# Patient Record
Sex: MALE | URBAN - METROPOLITAN AREA
[De-identification: names, ages, dates, MRNs, and addresses within clinical notes are randomized per-mention and may not be internally consistent; named-entity substitution may affect disease eponyms.]

---

## 2022-12-13 ENCOUNTER — HOSPITAL ENCOUNTER (OUTPATIENT)
Facility: HOSPITAL | Age: 57
Discharge: ANOTHER HOSPITAL | DRG: 247 | End: 2022-12-17
Attending: THORACIC SURGERY (CARDIOTHORACIC VASCULAR SURGERY) | Admitting: THORACIC SURGERY (CARDIOTHORACIC VASCULAR SURGERY)
Payer: COMMERCIAL

## 2022-12-13 DIAGNOSIS — I25.10 CAD (CORONARY ARTERY DISEASE): ICD-10-CM

## 2022-12-13 DIAGNOSIS — R07.9 CHEST PAIN: ICD-10-CM

## 2022-12-13 PROCEDURE — G0378 HOSPITAL OBSERVATION PER HR: HCPCS

## 2022-12-13 PROCEDURE — G0379 DIRECT REFER HOSPITAL OBSERV: HCPCS

## 2022-12-13 PROCEDURE — 20600001 HC STEP DOWN PRIVATE ROOM

## 2022-12-13 NOTE — Clinical Note
120 ml of contrast were injected throughout the case. 80 mL of contrast was the total wasted during the case. 200 mL was the total amount used during the case.

## 2022-12-13 NOTE — Clinical Note
The groin and right radial was prepped. The site was prepped with ChloraPrep. The site was clipped. The patient was draped.

## 2022-12-14 LAB
ANION GAP SERPL CALC-SCNC: 9 MMOL/L (ref 8–16)
APTT BLDCRRT: 29.1 SEC (ref 21–32)
BASOPHILS # BLD AUTO: 0.06 K/UL (ref 0–0.2)
BASOPHILS NFR BLD: 0.7 % (ref 0–1.9)
BUN SERPL-MCNC: 12 MG/DL (ref 6–20)
CALCIUM SERPL-MCNC: 9 MG/DL (ref 8.7–10.5)
CHLORIDE SERPL-SCNC: 105 MMOL/L (ref 95–110)
CO2 SERPL-SCNC: 23 MMOL/L (ref 23–29)
CREAT SERPL-MCNC: 0.8 MG/DL (ref 0.5–1.4)
DIFFERENTIAL METHOD: ABNORMAL
EOSINOPHIL # BLD AUTO: 0.2 K/UL (ref 0–0.5)
EOSINOPHIL NFR BLD: 2.9 % (ref 0–8)
ERYTHROCYTE [DISTWIDTH] IN BLOOD BY AUTOMATED COUNT: 12.4 % (ref 11.5–14.5)
EST. GFR  (NO RACE VARIABLE): >60 ML/MIN/1.73 M^2
GLUCOSE SERPL-MCNC: 83 MG/DL (ref 70–110)
HCT VFR BLD AUTO: 40.1 % (ref 40–54)
HGB BLD-MCNC: 13.4 G/DL (ref 14–18)
IMM GRANULOCYTES # BLD AUTO: 0.02 K/UL (ref 0–0.04)
IMM GRANULOCYTES NFR BLD AUTO: 0.2 % (ref 0–0.5)
INR PPP: 1.1 (ref 0.8–1.2)
LYMPHOCYTES # BLD AUTO: 1.8 K/UL (ref 1–4.8)
LYMPHOCYTES NFR BLD: 22.6 % (ref 18–48)
MCH RBC QN AUTO: 32.4 PG (ref 27–31)
MCHC RBC AUTO-ENTMCNC: 33.4 G/DL (ref 32–36)
MCV RBC AUTO: 97 FL (ref 82–98)
MONOCYTES # BLD AUTO: 0.5 K/UL (ref 0.3–1)
MONOCYTES NFR BLD: 6.4 % (ref 4–15)
NEUTROPHILS # BLD AUTO: 5.4 K/UL (ref 1.8–7.7)
NEUTROPHILS NFR BLD: 67.2 % (ref 38–73)
NRBC BLD-RTO: 0 /100 WBC
PLATELET # BLD AUTO: 189 K/UL (ref 150–450)
PMV BLD AUTO: 9.5 FL (ref 9.2–12.9)
POTASSIUM SERPL-SCNC: 4 MMOL/L (ref 3.5–5.1)
PROTHROMBIN TIME: 11 SEC (ref 9–12.5)
RBC # BLD AUTO: 4.13 M/UL (ref 4.6–6.2)
SODIUM SERPL-SCNC: 137 MMOL/L (ref 136–145)
WBC # BLD AUTO: 8.02 K/UL (ref 3.9–12.7)

## 2022-12-14 PROCEDURE — 85610 PROTHROMBIN TIME: CPT | Performed by: STUDENT IN AN ORGANIZED HEALTH CARE EDUCATION/TRAINING PROGRAM

## 2022-12-14 PROCEDURE — 85730 THROMBOPLASTIN TIME PARTIAL: CPT | Mod: 91 | Performed by: THORACIC SURGERY (CARDIOTHORACIC VASCULAR SURGERY)

## 2022-12-14 PROCEDURE — 96365 THER/PROPH/DIAG IV INF INIT: CPT

## 2022-12-14 PROCEDURE — 80048 BASIC METABOLIC PNL TOTAL CA: CPT | Performed by: STUDENT IN AN ORGANIZED HEALTH CARE EDUCATION/TRAINING PROGRAM

## 2022-12-14 PROCEDURE — 63600175 PHARM REV CODE 636 W HCPCS: Performed by: STUDENT IN AN ORGANIZED HEALTH CARE EDUCATION/TRAINING PROGRAM

## 2022-12-14 PROCEDURE — 36415 COLL VENOUS BLD VENIPUNCTURE: CPT | Performed by: STUDENT IN AN ORGANIZED HEALTH CARE EDUCATION/TRAINING PROGRAM

## 2022-12-14 PROCEDURE — 94761 N-INVAS EAR/PLS OXIMETRY MLT: CPT

## 2022-12-14 PROCEDURE — 85730 THROMBOPLASTIN TIME PARTIAL: CPT | Performed by: STUDENT IN AN ORGANIZED HEALTH CARE EDUCATION/TRAINING PROGRAM

## 2022-12-14 PROCEDURE — 20600001 HC STEP DOWN PRIVATE ROOM

## 2022-12-14 PROCEDURE — G0378 HOSPITAL OBSERVATION PER HR: HCPCS

## 2022-12-14 PROCEDURE — 85025 COMPLETE CBC W/AUTO DIFF WBC: CPT | Performed by: STUDENT IN AN ORGANIZED HEALTH CARE EDUCATION/TRAINING PROGRAM

## 2022-12-14 PROCEDURE — 25000003 PHARM REV CODE 250: Performed by: STUDENT IN AN ORGANIZED HEALTH CARE EDUCATION/TRAINING PROGRAM

## 2022-12-14 PROCEDURE — 99900035 HC TECH TIME PER 15 MIN (STAT)

## 2022-12-14 RX ORDER — HEPARIN SODIUM,PORCINE/D5W 25000/250
0-40 INTRAVENOUS SOLUTION INTRAVENOUS CONTINUOUS
Status: DISCONTINUED | OUTPATIENT
Start: 2022-12-14 | End: 2022-12-15

## 2022-12-14 RX ORDER — WARFARIN 10 MG/1
10 TABLET ORAL DAILY
COMMUNITY

## 2022-12-14 RX ORDER — PRAVASTATIN SODIUM 20 MG/1
20 TABLET ORAL NIGHTLY
Status: DISCONTINUED | OUTPATIENT
Start: 2022-12-14 | End: 2022-12-18 | Stop reason: HOSPADM

## 2022-12-14 RX ORDER — WARFARIN 7.5 MG/1
7.5 TABLET ORAL DAILY
COMMUNITY

## 2022-12-14 RX ORDER — AMLODIPINE BESYLATE 2.5 MG/1
2.5 TABLET ORAL DAILY
COMMUNITY

## 2022-12-14 RX ORDER — PANTOPRAZOLE SODIUM 40 MG/1
40 TABLET, DELAYED RELEASE ORAL DAILY
Status: DISCONTINUED | OUTPATIENT
Start: 2022-12-15 | End: 2022-12-15

## 2022-12-14 RX ORDER — ONDANSETRON 4 MG/1
8 TABLET, ORALLY DISINTEGRATING ORAL EVERY 8 HOURS PRN
Status: DISCONTINUED | OUTPATIENT
Start: 2022-12-14 | End: 2022-12-18 | Stop reason: HOSPADM

## 2022-12-14 RX ORDER — OXYCODONE HYDROCHLORIDE 5 MG/1
5 TABLET ORAL EVERY 4 HOURS PRN
Status: DISCONTINUED | OUTPATIENT
Start: 2022-12-14 | End: 2022-12-18 | Stop reason: HOSPADM

## 2022-12-14 RX ORDER — ASPIRIN 81 MG/1
81 TABLET ORAL DAILY
Status: DISCONTINUED | OUTPATIENT
Start: 2022-12-15 | End: 2022-12-18 | Stop reason: HOSPADM

## 2022-12-14 RX ORDER — PRAVASTATIN SODIUM 20 MG/1
20 TABLET ORAL NIGHTLY
COMMUNITY

## 2022-12-14 RX ORDER — ASPIRIN 325 MG
325 TABLET ORAL DAILY
COMMUNITY

## 2022-12-14 RX ORDER — OMEPRAZOLE 20 MG/1
20 CAPSULE, DELAYED RELEASE ORAL DAILY
COMMUNITY

## 2022-12-14 RX ORDER — SODIUM CHLORIDE 0.9 % (FLUSH) 0.9 %
10 SYRINGE (ML) INJECTION
Status: DISCONTINUED | OUTPATIENT
Start: 2022-12-14 | End: 2022-12-18 | Stop reason: HOSPADM

## 2022-12-14 RX ADMIN — PRAVASTATIN SODIUM 20 MG: 20 TABLET ORAL at 09:12

## 2022-12-14 RX ADMIN — HEPARIN SODIUM 19 UNITS/KG/HR: 10000 INJECTION, SOLUTION INTRAVENOUS at 06:12

## 2022-12-14 NOTE — H&P
[facility] - [facility]iology Step[facility]  CTS  History & Physical    Subjective:     History of Present Illness:  Juventino You is a 57 y.o. male with h/o mitral valve replacement (2001, mechanical), HTN, smoker who presents as transfer for CABG evaluation. Patient reports he is normally active and can climb a flight of stairs without getting out of breath, but over the past month he has been getting chest pain on exertion and short of breath with activity. Chest pain resolves with rest. He presented to outside hospital with chest pain and LHC showed multivessel disease. Normal EF on echo. Patient currently on heparin gtt and holding coumadin.   Patient is current 1.5ppd smoker.     No current facility-administered medications on file prior to encounter.     Current Outpatient Medications on File Prior to Encounter   Medication Sig    amLODIPine (NORVASC) 2.5 MG tablet Take 2.5 mg by mouth once daily.    aspirin 325 MG tablet Take 325 mg by mouth once daily.    omeprazole (PRILOSEC) 20 MG capsule Take 20 mg by mouth once daily.    pravastatin (PRAVACHOL) 20 MG tablet Take 20 mg by mouth every evening.    warfarin (COUMADIN) 10 MG tablet Take 10 mg by mouth once daily. Every Mon and Weds    warfarin (COUMADIN) 7.5 MG tablet Take 7.5 mg by mouth once daily. Every tues, Thurs, Fri, Sat. And Sun       Review of patient's allergies indicates:  No Known Allergies    No past medical history on file.  No past surgical history on file.  Family History    None       Tobacco Use    Smoking status: Not on file    Smokeless tobacco: Not on file   Substance and Sexual Activity    Alcohol use: Not on file    Drug use: Not on file    Sexual activity: Not on file     Review of Systems   Constitutional:  Negative for activity change, chills and fever.   Respiratory:  Negative for cough and shortness of breath.    Cardiovascular:  Negative for chest pain and palpitations.   Gastrointestinal:  Negative for abdominal distention and abdominal pain.    Musculoskeletal:  Negative for arthralgias and myalgias.   Neurological:  Negative for dizziness and headaches.   Psychiatric/Behavioral:  Negative for agitation and confusion.    Objective:     Vital Signs (Most Recent):  Temp: 98.2 °F (36.8 °C) (12/14/22 1634)  Pulse: (!) 54 (12/14/22 1634)  Resp: 18 (12/14/22 1634)  BP: 122/66 (12/14/22 1634)  SpO2: 95 % (12/14/22 1634)   Vital Signs (24h Range):  Temp:  [98.2 °F (36.8 °C)] 98.2 °F (36.8 °C)  Pulse:  [50-54] 54  Resp:  [17-18] 18  SpO2:  [95 %-98 %] 95 %  BP: (115-122)/(66-69) 122/66     Weight: 100.8 kg (222 lb 3.6 oz)  Body mass index is 29.32 kg/m².    Physical Exam  Constitutional:       General: He is not in acute distress.     Appearance: He is well-developed.   Cardiovascular:      Rate and Rhythm: Normal rate and regular rhythm.      Comments: Well healed sternal incision  Pulmonary:      Effort: Pulmonary effort is normal. No respiratory distress.   Abdominal:      General: There is no distension.      Palpations: Abdomen is soft.      Tenderness: There is no abdominal tenderness.   Skin:     General: Skin is warm and dry.   Neurological:      Mental Status: He is alert and oriented to person, place, and time.   Psychiatric:         Behavior: Behavior normal.       Significant Labs:  I have reviewed all pertinent lab results within the past 24 hours.  CBC: No results for input(s): WBC, RBC, HGB, HCT, PLT, MCV, MCH, MCHC in the last 168 hours.  BMP: No results for input(s): GLU, NA, K, CL, CO2, BUN, CREATININE, CALCIUM, MG in the last 168 hours.    Significant Diagnostics:  I have reviewed all pertinent imaging results/findings within the past 24 hours.    Assessment/Plan:     Patient with multivessel CAD and h/o mechanical mitral valve replacement.     - Will need to obtain angiogram. CD sent with records contains CT chest and chest xrays.   - Continue heparin gtt, PTT goal 60-80  - ASA, statin  - Cardiac diet  - Will review angiogram and discuss  surgical timing.    Selam Santizo MD, PGY-7  Cardiothoracic Surgery   [phone]

## 2022-12-14 NOTE — NURSING
Pt is currently not in the hospital - the unit is currently waiting for pt to come from [facility] [place]- pt was mistakenly placed in bed.

## 2022-12-15 PROBLEM — I10 ESSENTIAL HYPERTENSION: Status: ACTIVE | Noted: 2022-12-15

## 2022-12-15 PROBLEM — I25.10 CAD (CORONARY ARTERY DISEASE): Status: ACTIVE | Noted: 2022-12-15

## 2022-12-15 PROBLEM — Z95.2 S/P MVR (MITRAL VALVE REPLACEMENT): Status: ACTIVE | Noted: 2022-12-15

## 2022-12-15 LAB
ABO + RH BLD: NORMAL
ANION GAP SERPL CALC-SCNC: 7 MMOL/L (ref 8–16)
APTT BLDCRRT: 45.6 SEC (ref 21–32)
APTT BLDCRRT: 48.6 SEC (ref 21–32)
ASCENDING AORTA: 3.66 CM
AV INDEX (PROSTH): 0.32
AV MEAN GRADIENT: 31 MMHG
AV PEAK GRADIENT: 47 MMHG
AV VALVE AREA: 1.26 CM2
AV VELOCITY RATIO: 0.27
BASOPHILS # BLD AUTO: 0.07 K/UL (ref 0–0.2)
BASOPHILS NFR BLD: 1 % (ref 0–1.9)
BLD GP AB SCN CELLS X3 SERPL QL: NORMAL
BSA FOR ECHO PROCEDURE: 2.28 M2
BUN SERPL-MCNC: 11 MG/DL (ref 6–20)
CALCIUM SERPL-MCNC: 9.1 MG/DL (ref 8.7–10.5)
CHLORIDE SERPL-SCNC: 104 MMOL/L (ref 95–110)
CO2 SERPL-SCNC: 26 MMOL/L (ref 23–29)
CREAT SERPL-MCNC: 1 MG/DL (ref 0.5–1.4)
CV ECHO LV RWT: 0.41 CM
DIFFERENTIAL METHOD: ABNORMAL
DOP CALC AO PEAK VEL: 3.42 M/S
DOP CALC AO VTI: 68.81 CM
DOP CALC LVOT AREA: 3.9 CM2
DOP CALC LVOT DIAMETER: 2.23 CM
DOP CALC LVOT PEAK VEL: 0.94 M/S
DOP CALC LVOT STROKE VOLUME: 86.51 CM3
DOP CALCLVOT PEAK VEL VTI: 22.16 CM
E WAVE DECELERATION TIME: 321.52 MSEC
E/A RATIO: 0.89
E/E' RATIO: 6.7 M/S
ECHO LV POSTERIOR WALL: 0.96 CM (ref 0.6–1.1)
EJECTION FRACTION: 68 %
EOSINOPHIL # BLD AUTO: 0.3 K/UL (ref 0–0.5)
EOSINOPHIL NFR BLD: 3.8 % (ref 0–8)
ERYTHROCYTE [DISTWIDTH] IN BLOOD BY AUTOMATED COUNT: 12.6 % (ref 11.5–14.5)
EST. GFR  (NO RACE VARIABLE): >60 ML/MIN/1.73 M^2
FRACTIONAL SHORTENING: 29 % (ref 28–44)
GLUCOSE SERPL-MCNC: 91 MG/DL (ref 70–110)
HCT VFR BLD AUTO: 41.5 % (ref 40–54)
HGB BLD-MCNC: 13.8 G/DL (ref 14–18)
IMM GRANULOCYTES # BLD AUTO: 0.04 K/UL (ref 0–0.04)
IMM GRANULOCYTES NFR BLD AUTO: 0.6 % (ref 0–0.5)
INTERVENTRICULAR SEPTUM: 1.12 CM (ref 0.6–1.1)
IVRT: 114.18 MSEC
LA MAJOR: 5.29 CM
LA MINOR: 5.76 CM
LA WIDTH: 4.66 CM
LEFT ATRIUM SIZE: 3.73 CM
LEFT ATRIUM VOLUME INDEX MOD: 32.2 ML/M2
LEFT ATRIUM VOLUME INDEX: 36.2 ML/M2
LEFT ATRIUM VOLUME MOD: 72.56 CM3
LEFT ATRIUM VOLUME: 81.48 CM3
LEFT INTERNAL DIMENSION IN SYSTOLE: 3.34 CM (ref 2.1–4)
LEFT VENTRICLE DIASTOLIC VOLUME INDEX: 44.94 ML/M2
LEFT VENTRICLE DIASTOLIC VOLUME: 101.12 ML
LEFT VENTRICLE MASS INDEX: 77 G/M2
LEFT VENTRICLE SYSTOLIC VOLUME INDEX: 20.2 ML/M2
LEFT VENTRICLE SYSTOLIC VOLUME: 45.48 ML
LEFT VENTRICULAR INTERNAL DIMENSION IN DIASTOLE: 4.68 CM (ref 3.5–6)
LEFT VENTRICULAR MASS: 172.34 G
LV LATERAL E/E' RATIO: 5.92 M/S
LV SEPTAL E/E' RATIO: 7.7 M/S
LYMPHOCYTES # BLD AUTO: 1.8 K/UL (ref 1–4.8)
LYMPHOCYTES NFR BLD: 24.4 % (ref 18–48)
MCH RBC QN AUTO: 33.1 PG (ref 27–31)
MCHC RBC AUTO-ENTMCNC: 33.3 G/DL (ref 32–36)
MCV RBC AUTO: 100 FL (ref 82–98)
MONOCYTES # BLD AUTO: 0.6 K/UL (ref 0.3–1)
MONOCYTES NFR BLD: 8.1 % (ref 4–15)
MV PEAK A VEL: 0.87 M/S
MV PEAK E VEL: 0.77 M/S
MV STENOSIS PRESSURE HALF TIME: 93.24 MS
MV VALVE AREA P 1/2 METHOD: 2.36 CM2
NEUTROPHILS # BLD AUTO: 4.5 K/UL (ref 1.8–7.7)
NEUTROPHILS NFR BLD: 62.1 % (ref 38–73)
NRBC BLD-RTO: 0 /100 WBC
PISA TR MAX VEL: 2.57 M/S
PLATELET # BLD AUTO: 166 K/UL (ref 150–450)
PMV BLD AUTO: 9.3 FL (ref 9.2–12.9)
POTASSIUM SERPL-SCNC: 4.5 MMOL/L (ref 3.5–5.1)
RA MAJOR: 4.95 CM
RA WIDTH: 3.6 CM
RBC # BLD AUTO: 4.17 M/UL (ref 4.6–6.2)
RIGHT VENTRICULAR END-DIASTOLIC DIMENSION: 3.67 CM
RV TISSUE DOPPLER FREE WALL SYSTOLIC VELOCITY 1 (APICAL 4 CHAMBER VIEW): 10.16 CM/S
SINUS: 3.27 CM
SODIUM SERPL-SCNC: 137 MMOL/L (ref 136–145)
STJ: 2.76 CM
TDI LATERAL: 0.13 M/S
TDI SEPTAL: 0.1 M/S
TDI: 0.12 M/S
TR MAX PG: 26 MMHG
TRICUSPID ANNULAR PLANE SYSTOLIC EXCURSION: 1.83 CM
WBC # BLD AUTO: 7.18 K/UL (ref 3.9–12.7)

## 2022-12-15 PROCEDURE — 85730 THROMBOPLASTIN TIME PARTIAL: CPT | Performed by: THORACIC SURGERY (CARDIOTHORACIC VASCULAR SURGERY)

## 2022-12-15 PROCEDURE — C2623 CATH, TRANSLUMIN, DRUG-COAT: HCPCS | Performed by: INTERNAL MEDICINE

## 2022-12-15 PROCEDURE — 25000003 PHARM REV CODE 250: Performed by: INTERNAL MEDICINE

## 2022-12-15 PROCEDURE — 36415 COLL VENOUS BLD VENIPUNCTURE: CPT | Performed by: STUDENT IN AN ORGANIZED HEALTH CARE EDUCATION/TRAINING PROGRAM

## 2022-12-15 PROCEDURE — 20600001 HC STEP DOWN PRIVATE ROOM

## 2022-12-15 PROCEDURE — 63600175 PHARM REV CODE 636 W HCPCS: Performed by: INTERNAL MEDICINE

## 2022-12-15 PROCEDURE — 86901 BLOOD TYPING SEROLOGIC RH(D): CPT | Performed by: STUDENT IN AN ORGANIZED HEALTH CARE EDUCATION/TRAINING PROGRAM

## 2022-12-15 PROCEDURE — C1887 CATHETER, GUIDING: HCPCS | Performed by: INTERNAL MEDICINE

## 2022-12-15 PROCEDURE — 25000003 PHARM REV CODE 250: Performed by: STUDENT IN AN ORGANIZED HEALTH CARE EDUCATION/TRAINING PROGRAM

## 2022-12-15 PROCEDURE — 63600175 PHARM REV CODE 636 W HCPCS: Performed by: NURSE PRACTITIONER

## 2022-12-15 PROCEDURE — C1894 INTRO/SHEATH, NON-LASER: HCPCS | Performed by: INTERNAL MEDICINE

## 2022-12-15 PROCEDURE — 92928 PRQ TCAT PLMT NTRAC ST 1 LES: CPT | Mod: LD,,, | Performed by: INTERNAL MEDICINE

## 2022-12-15 PROCEDURE — 93005 ELECTROCARDIOGRAM TRACING: CPT | Mod: 59

## 2022-12-15 PROCEDURE — 27201423 OPTIME MED/SURG SUP & DEVICES STERILE SUPPLY: Performed by: INTERNAL MEDICINE

## 2022-12-15 PROCEDURE — G0378 HOSPITAL OBSERVATION PER HR: HCPCS

## 2022-12-15 PROCEDURE — 99233 PR SUBSEQUENT HOSPITAL CARE,LEVL III: ICD-10-PCS | Mod: ,,, | Performed by: INTERNAL MEDICINE

## 2022-12-15 PROCEDURE — C9600 PERC DRUG-EL COR STENT SING: HCPCS | Mod: LD | Performed by: INTERNAL MEDICINE

## 2022-12-15 PROCEDURE — 96376 TX/PRO/DX INJ SAME DRUG ADON: CPT | Mod: 59

## 2022-12-15 PROCEDURE — 96366 THER/PROPH/DIAG IV INF ADDON: CPT

## 2022-12-15 PROCEDURE — 93010 EKG 12-LEAD: ICD-10-PCS | Mod: ,,, | Performed by: INTERNAL MEDICINE

## 2022-12-15 PROCEDURE — 96375 TX/PRO/DX INJ NEW DRUG ADDON: CPT

## 2022-12-15 PROCEDURE — 99233 SBSQ HOSP IP/OBS HIGH 50: CPT | Mod: ,,, | Performed by: INTERNAL MEDICINE

## 2022-12-15 PROCEDURE — 63600175 PHARM REV CODE 636 W HCPCS: Performed by: STUDENT IN AN ORGANIZED HEALTH CARE EDUCATION/TRAINING PROGRAM

## 2022-12-15 PROCEDURE — 85025 COMPLETE CBC W/AUTO DIFF WBC: CPT | Performed by: STUDENT IN AN ORGANIZED HEALTH CARE EDUCATION/TRAINING PROGRAM

## 2022-12-15 PROCEDURE — 36415 COLL VENOUS BLD VENIPUNCTURE: CPT | Performed by: THORACIC SURGERY (CARDIOTHORACIC VASCULAR SURGERY)

## 2022-12-15 PROCEDURE — C1725 CATH, TRANSLUMIN NON-LASER: HCPCS | Performed by: INTERNAL MEDICINE

## 2022-12-15 PROCEDURE — 92928 PR STENT: ICD-10-PCS | Mod: LD,,, | Performed by: INTERNAL MEDICINE

## 2022-12-15 PROCEDURE — 80048 BASIC METABOLIC PNL TOTAL CA: CPT | Performed by: STUDENT IN AN ORGANIZED HEALTH CARE EDUCATION/TRAINING PROGRAM

## 2022-12-15 PROCEDURE — 93010 ELECTROCARDIOGRAM REPORT: CPT | Mod: ,,, | Performed by: INTERNAL MEDICINE

## 2022-12-15 PROCEDURE — 25000003 PHARM REV CODE 250: Performed by: NURSE PRACTITIONER

## 2022-12-15 PROCEDURE — 25500020 PHARM REV CODE 255: Performed by: INTERNAL MEDICINE

## 2022-12-15 PROCEDURE — C1769 GUIDE WIRE: HCPCS | Performed by: INTERNAL MEDICINE

## 2022-12-15 PROCEDURE — 85347 COAGULATION TIME ACTIVATED: CPT | Performed by: INTERNAL MEDICINE

## 2022-12-15 DEVICE — STENT ONYXNG30018UX ONYX 3.00X18RX
Type: IMPLANTABLE DEVICE | Site: HEART | Status: FUNCTIONAL
Brand: ONYX FRONTIER™

## 2022-12-15 DEVICE — STENT ONYXNG30008UX ONYX 3.00X08RX
Type: IMPLANTABLE DEVICE | Site: HEART | Status: FUNCTIONAL
Brand: ONYX FRONTIER™

## 2022-12-15 RX ORDER — FAMOTIDINE 20 MG/1
20 TABLET, FILM COATED ORAL 2 TIMES DAILY
Status: DISCONTINUED | OUTPATIENT
Start: 2022-12-15 | End: 2022-12-18 | Stop reason: HOSPADM

## 2022-12-15 RX ORDER — LIDOCAINE HYDROCHLORIDE 10 MG/ML
INJECTION INFILTRATION; PERINEURAL
Status: DISCONTINUED | OUTPATIENT
Start: 2022-12-15 | End: 2022-12-18 | Stop reason: HOSPADM

## 2022-12-15 RX ORDER — CLOPIDOGREL 300 MG/1
600 TABLET, FILM COATED ORAL ONCE
Status: DISCONTINUED | OUTPATIENT
Start: 2022-12-15 | End: 2022-12-15

## 2022-12-15 RX ORDER — SODIUM CHLORIDE 0.9 % (FLUSH) 0.9 %
10 SYRINGE (ML) INJECTION
Status: DISCONTINUED | OUTPATIENT
Start: 2022-12-15 | End: 2022-12-18 | Stop reason: HOSPADM

## 2022-12-15 RX ORDER — HEPARIN SOD,PORCINE/0.9 % NACL 1000/500ML
INTRAVENOUS SOLUTION INTRAVENOUS
Status: DISCONTINUED | OUTPATIENT
Start: 2022-12-15 | End: 2022-12-18 | Stop reason: HOSPADM

## 2022-12-15 RX ORDER — METOPROLOL TARTRATE 25 MG/1
12.5 TABLET ORAL 2 TIMES DAILY
Status: DISCONTINUED | OUTPATIENT
Start: 2022-12-15 | End: 2022-12-18 | Stop reason: HOSPADM

## 2022-12-15 RX ORDER — POTASSIUM CHLORIDE 20 MEQ/1
20 TABLET, EXTENDED RELEASE ORAL 2 TIMES DAILY
Status: DISCONTINUED | OUTPATIENT
Start: 2022-12-15 | End: 2022-12-18 | Stop reason: HOSPADM

## 2022-12-15 RX ORDER — SODIUM CHLORIDE 9 MG/ML
INJECTION, SOLUTION INTRAVENOUS CONTINUOUS
Status: ACTIVE | OUTPATIENT
Start: 2022-12-15 | End: 2022-12-15

## 2022-12-15 RX ORDER — FUROSEMIDE 10 MG/ML
20 INJECTION INTRAMUSCULAR; INTRAVENOUS 2 TIMES DAILY
Status: DISCONTINUED | OUTPATIENT
Start: 2022-12-15 | End: 2022-12-17

## 2022-12-15 RX ORDER — CLOPIDOGREL 300 MG/1
600 TABLET, FILM COATED ORAL ONCE
Status: COMPLETED | OUTPATIENT
Start: 2022-12-15 | End: 2022-12-15

## 2022-12-15 RX ORDER — HEPARIN SODIUM 1000 [USP'U]/ML
INJECTION, SOLUTION INTRAVENOUS; SUBCUTANEOUS
Status: DISCONTINUED | OUTPATIENT
Start: 2022-12-15 | End: 2022-12-18 | Stop reason: HOSPADM

## 2022-12-15 RX ORDER — DIPHENHYDRAMINE HYDROCHLORIDE 50 MG/ML
INJECTION INTRAMUSCULAR; INTRAVENOUS
Status: DISCONTINUED | OUTPATIENT
Start: 2022-12-15 | End: 2022-12-18 | Stop reason: HOSPADM

## 2022-12-15 RX ORDER — CLOPIDOGREL BISULFATE 75 MG/1
75 TABLET ORAL DAILY
Status: DISCONTINUED | OUTPATIENT
Start: 2022-12-16 | End: 2022-12-18 | Stop reason: HOSPADM

## 2022-12-15 RX ORDER — HEPARIN SODIUM 10000 [USP'U]/100ML
1700 INJECTION, SOLUTION INTRAVENOUS CONTINUOUS
Status: DISCONTINUED | OUTPATIENT
Start: 2022-12-15 | End: 2022-12-16

## 2022-12-15 RX ORDER — DIPHENHYDRAMINE HCL 50 MG
50 CAPSULE ORAL ONCE
Status: COMPLETED | OUTPATIENT
Start: 2022-12-15 | End: 2022-12-15

## 2022-12-15 RX ORDER — PHENYLEPHRINE HYDROCHLORIDE 10 MG/ML
INJECTION INTRAVENOUS
Status: DISCONTINUED | OUTPATIENT
Start: 2022-12-15 | End: 2022-12-18 | Stop reason: HOSPADM

## 2022-12-15 RX ADMIN — METOPROLOL TARTRATE 12.5 MG: 25 TABLET, FILM COATED ORAL at 08:12

## 2022-12-15 RX ADMIN — HEPARIN SODIUM 1700 UNITS/HR: 10000 INJECTION, SOLUTION INTRAVENOUS at 10:12

## 2022-12-15 RX ADMIN — FUROSEMIDE 20 MG: 10 INJECTION, SOLUTION INTRAMUSCULAR; INTRAVENOUS at 12:12

## 2022-12-15 RX ADMIN — PRAVASTATIN SODIUM 20 MG: 20 TABLET ORAL at 08:12

## 2022-12-15 RX ADMIN — FAMOTIDINE 20 MG: 20 TABLET ORAL at 08:12

## 2022-12-15 RX ADMIN — PANTOPRAZOLE SODIUM 40 MG: 40 TABLET, DELAYED RELEASE ORAL at 08:12

## 2022-12-15 RX ADMIN — FUROSEMIDE 20 MG: 10 INJECTION, SOLUTION INTRAMUSCULAR; INTRAVENOUS at 08:12

## 2022-12-15 RX ADMIN — POTASSIUM CHLORIDE 20 MEQ: 1500 TABLET, EXTENDED RELEASE ORAL at 08:12

## 2022-12-15 RX ADMIN — ASPIRIN 81 MG: 81 TABLET, COATED ORAL at 08:12

## 2022-12-15 RX ADMIN — DIPHENHYDRAMINE HYDROCHLORIDE 50 MG: 50 CAPSULE ORAL at 03:12

## 2022-12-15 RX ADMIN — CLOPIDOGREL BISULFATE 600 MG: 300 TABLET, FILM COATED ORAL at 03:12

## 2022-12-15 RX ADMIN — HEPARIN SODIUM 19 UNITS/KG/HR: 10000 INJECTION, SOLUTION INTRAVENOUS at 06:12

## 2022-12-15 RX ADMIN — POTASSIUM CHLORIDE 20 MEQ: 1500 TABLET, EXTENDED RELEASE ORAL at 12:12

## 2022-12-15 RX ADMIN — HEPARIN SODIUM 1700 UNITS/HR: 10000 INJECTION, SOLUTION INTRAVENOUS at 11:12

## 2022-12-15 NOTE — PROGRESS NOTES
[facility] - [facility] Stepdown  Cardiothoracic Surgery  Progress Note    Patient Name: Juventino You  MRN: [id number]  Admission Date: 12/13/2022  Hospital Length of Stay: 2 days  Code Status: Full Code   Attending Physician: Vinicius Spencer MD   Referring Provider: Bret Gunter Jr.,*  Principal Problem:CAD (coronary artery disease)    Subjective:     Post-Op Info:  * No surgery found *         Interval History: Patient admitted overnight for CABG evaluation vs PCI.  Cath films in [facility].  Consult placed to interventional cardiology. Previous MVR, will need CT CAP to determine if it is safe for re-entry. Will determine needs once work up is completed.    Review of Systems   Constitutional: Negative for malaise/fatigue.   Cardiovascular:  Negative for chest pain, claudication, dyspnea on exertion, irregular heartbeat, leg swelling and palpitations.   Respiratory:  Negative for cough and shortness of breath.    Hematologic/Lymphatic: Negative for bleeding problem.   Gastrointestinal:  Negative for abdominal pain.   Genitourinary:  Negative for dysuria.   Neurological:  Negative for headaches and weakness.   Medications:  Continuous Infusions:   heparin (porcine) in D5W 19 Units/kg/hr (12/15/22 0613)     Scheduled Meds:   aspirin  81 mg Oral Daily    pantoprazole  40 mg Oral Daily    pravastatin  20 mg Oral QHS     PRN Meds:heparin (PORCINE), heparin (PORCINE), ondansetron, oxyCODONE, sodium chloride 0.9%     Objective:     Vital Signs (Most Recent):  Temp: 97.4 °F (36.3 °C) (12/15/22 0735)  Pulse: (!) 49 (12/15/22 0742)  Resp: 18 (12/15/22 0735)  BP: (!) 110/58 (12/15/22 0735)  SpO2: 98 % (12/15/22 0735)   Vital Signs (24h Range):  Temp:  [96.4 °F (35.8 °C)-98.2 °F (36.8 °C)] 97.4 °F (36.3 °C)  Pulse:  [45-63] 49  Resp:  [16-18] 18  SpO2:  [95 %-98 %] 98 %  BP: (103-132)/(58-66) 110/58     Weight: 100.8 kg (222 lb 3.6 oz)  Body mass index is 29.32 kg/m².    SpO2: 98 %       Intake/Output - Last 3 Shifts          12/13 0700  12/14 0659 12/14 0700  12/15 0659 12/15 0700  12/16 0659           Urine Occurrence  2 x             Lines/Drains/Airways       Peripheral Intravenous Line  Duration                  Peripheral IV - Single Lumen 12/14/22 1908 20 G Left Wrist <1 day                    Physical Exam  HENT:      Head: Normocephalic and atraumatic.   Eyes:      Extraocular Movements: Extraocular movements intact.   Cardiovascular:      Rate and Rhythm: Normal rate and regular rhythm.      Heart sounds: Normal heart sounds.   Pulmonary:      Effort: Pulmonary effort is normal.      Breath sounds: Normal breath sounds.   Abdominal:      General: Abdomen is flat.      Palpations: Abdomen is soft.   Musculoskeletal:         General: Normal range of motion.      Cervical back: Normal range of motion.   Skin:     General: Skin is warm and dry.      Capillary Refill: Capillary refill takes less than 2 seconds.   Neurological:      General: No focal deficit present.       Significant Labs:  BMP:   Recent Labs   Lab 12/15/22  0555   GLU 91      K 4.5      CO2 26   BUN 11   CREATININE 1.0   CALCIUM 9.1     CBC:   Recent Labs   Lab 12/15/22  0555   WBC 7.18   RBC 4.17*   HGB 13.8*   HCT 41.5      *   MCH 33.1*   MCHC 33.3     CMP:   Recent Labs   Lab 12/15/22  0555   GLU 91   CALCIUM 9.1      K 4.5   CO2 26      BUN 11   CREATININE 1.0     Coagulation:   Recent Labs   Lab 12/14/22  1717 12/14/22  2348 12/15/22  0555   INR 1.1  --   --    APTT 29.1   < > 48.6*    < > = values in this interval not displayed.       Significant Diagnostics:  I have reviewed and interpreted all pertinent imaging results/findings within the past 24 hours.    Assessment/Plan:     * CAD (coronary artery disease)   - ASA  - BB  - Statin  - Lasix  with scheduled potassium ordered   - Encourage ambulation  - Cardiac diet with 1500 cc fluid restriction   - Bowel regimen in place   - Famotidine for ulcer prevention   -  Monitor electrolytes to keep Mag above 2 and Potassium above 4  - OOBTO   - Encourage IS use  - Heparin - no nomogram  - PTT goal is 60-80      Essential hypertension  - Holding home amlodipine   - Keeps MAPs 60-80    S/P MVR (mitral valve replacement)  - S/P Mechanical MVR in 2001  - Holding home Coumadin   - Heparin drip with no nomogram   - PTT goal is 60-80  - q6 hour PTT while on heparin   - Contact CTS provider for increase in heparin drip if outside of PTT goal        Robyn Perales NP  Cardiothoracic Surgery  [facility] Step[facility]

## 2022-12-15 NOTE — SUBJECTIVE & OBJECTIVE
Interval History: Patient admitted overnight for CABG evaluation vs PCI.  Cath films in [facility].  Consult placed to interventional cardiology. Previous MVR, will need CT CAP to determine if it is safe for re-entry. Will determine needs once work up is completed.    Review of Systems   Constitutional: Negative for malaise/fatigue.   Cardiovascular:  Negative for chest pain, claudication, dyspnea on exertion, irregular heartbeat, leg swelling and palpitations.   Respiratory:  Negative for cough and shortness of breath.    Hematologic/Lymphatic: Negative for bleeding problem.   Gastrointestinal:  Negative for abdominal pain.   Genitourinary:  Negative for dysuria.   Neurological:  Negative for headaches and weakness.   Medications:  Continuous Infusions:   heparin (porcine) in D5W 19 Units/kg/hr (12/15/22 0613)     Scheduled Meds:   aspirin  81 mg Oral Daily    pantoprazole  40 mg Oral Daily    pravastatin  20 mg Oral QHS     PRN Meds:heparin (PORCINE), heparin (PORCINE), ondansetron, oxyCODONE, sodium chloride 0.9%     Objective:     Vital Signs (Most Recent):  Temp: 97.4 °F (36.3 °C) (12/15/22 0735)  Pulse: (!) 49 (12/15/22 0742)  Resp: 18 (12/15/22 0735)  BP: (!) 110/58 (12/15/22 0735)  SpO2: 98 % (12/15/22 0735)   Vital Signs (24h Range):  Temp:  [96.4 °F (35.8 °C)-98.2 °F (36.8 °C)] 97.4 °F (36.3 °C)  Pulse:  [45-63] 49  Resp:  [16-18] 18  SpO2:  [95 %-98 %] 98 %  BP: (103-132)/(58-66) 110/58     Weight: 100.8 kg (222 lb 3.6 oz)  Body mass index is 29.32 kg/m².    SpO2: 98 %       Intake/Output - Last 3 Shifts         12/13 0700  12/14 0659 12/14 0700  12/15 0659 12/15 0700  12/16 0659           Urine Occurrence  2 x             Lines/Drains/Airways       Peripheral Intravenous Line  Duration                  Peripheral IV - Single Lumen 12/14/22 1908 20 G Left Wrist <1 day                    Physical Exam  HENT:      Head: Normocephalic and atraumatic.   Eyes:      Extraocular Movements: Extraocular movements  intact.   Cardiovascular:      Rate and Rhythm: Normal rate and regular rhythm.      Heart sounds: Normal heart sounds.   Pulmonary:      Effort: Pulmonary effort is normal.      Breath sounds: Normal breath sounds.   Abdominal:      General: Abdomen is flat.      Palpations: Abdomen is soft.   Musculoskeletal:         General: Normal range of motion.      Cervical back: Normal range of motion.   Skin:     General: Skin is warm and dry.      Capillary Refill: Capillary refill takes less than 2 seconds.   Neurological:      General: No focal deficit present.       Significant Labs:  BMP:   Recent Labs   Lab 12/15/22  0555   GLU 91      K 4.5      CO2 26   BUN 11   CREATININE 1.0   CALCIUM 9.1     CBC:   Recent Labs   Lab 12/15/22  0555   WBC 7.18   RBC 4.17*   HGB 13.8*   HCT 41.5      *   MCH 33.1*   MCHC 33.3     CMP:   Recent Labs   Lab 12/15/22  0555   GLU 91   CALCIUM 9.1      K 4.5   CO2 26      BUN 11   CREATININE 1.0     Coagulation:   Recent Labs   Lab 12/14/22  1717 12/14/22  2348 12/15/22  0555   INR 1.1  --   --    APTT 29.1   < > 48.6*    < > = values in this interval not displayed.       Significant Diagnostics:  I have reviewed and interpreted all pertinent imaging results/findings within the past 24 hours.

## 2022-12-15 NOTE — SUBJECTIVE & OBJECTIVE
No past medical history on file.    No past surgical history on file.    Review of patient's allergies indicates:  No Known Allergies    PTA Medications   Medication Sig    amLODIPine (NORVASC) 2.5 MG tablet Take 2.5 mg by mouth once daily.    aspirin 325 MG tablet Take 325 mg by mouth once daily.    omeprazole (PRILOSEC) 20 MG capsule Take 20 mg by mouth once daily.    pravastatin (PRAVACHOL) 20 MG tablet Take 20 mg by mouth every evening.    warfarin (COUMADIN) 10 MG tablet Take 10 mg by mouth once daily. Every Mon and Weds    warfarin (COUMADIN) 7.5 MG tablet Take 7.5 mg by mouth once daily. Every tues, Thurs, Fri, Sat. And Sun     Family History    None       Tobacco Use    Smoking status: Not on file    Smokeless tobacco: Not on file   Substance and Sexual Activity    Alcohol use: Not on file    Drug use: Not on file    Sexual activity: Not on file     Review of Systems   Cardiovascular:  Positive for chest pain.   All other systems reviewed and are negative.  Objective:     Vital Signs (Most Recent):  Temp: 98 °F (36.7 °C) (12/15/22 1140)  Pulse: (!) 58 (12/15/22 1140)  Resp: 18 (12/15/22 1140)  BP: 125/70 (12/15/22 1140)  SpO2: 97 % (12/15/22 1140)   Vital Signs (24h Range):  Temp:  [96.4 °F (35.8 °C)-98.2 °F (36.8 °C)] 98 °F (36.7 °C)  Pulse:  [45-63] 58  Resp:  [16-18] 18  SpO2:  [95 %-98 %] 97 %  BP: (103-132)/(58-70) 125/70     Weight: 100.7 kg (222 lb)  Body mass index is 29.29 kg/m².    SpO2: 97 %       No intake or output data in the 24 hours ending 12/15/22 1340    Lines/Drains/Airways       Peripheral Intravenous Line  Duration                  Peripheral IV - Single Lumen 12/14/22 1908 20 G Left Wrist <1 day                    Physical Exam  Vitals and nursing note reviewed.   Constitutional:       Appearance: Normal appearance.   HENT:      Head: Normocephalic and atraumatic.      Right Ear: External ear normal.      Left Ear: External ear normal.      Nose: Nose normal.      Mouth/Throat:       Mouth: Mucous membranes are moist.   Eyes:      Pupils: Pupils are equal, round, and reactive to light.   Cardiovascular:      Rate and Rhythm: Normal rate and regular rhythm.      Pulses: Normal pulses.   Pulmonary:      Effort: Pulmonary effort is normal.   Abdominal:      General: Abdomen is flat.   Musculoskeletal:         General: Normal range of motion.      Cervical back: Normal range of motion.      Right lower leg: No edema.      Left lower leg: No edema.   Skin:     General: Skin is warm and dry.      Capillary Refill: Capillary refill takes less than 2 seconds.   Neurological:      General: No focal deficit present.      Mental Status: He is alert and oriented to person, place, and time.       Significant Labs: All pertinent lab results from the last 24 hours have been reviewed.    Significant Imaging:  reviewed

## 2022-12-15 NOTE — CONSULTS
[facility] - [facility] Stepdown  Interventional Cardiology  Consult Note    Patient Name: Juvention You  MRN: [id number]  Admission Date: 12/13/2022  Hospital Length of Stay: 2 days  Code Status: Full Code   Attending Provider: Vinicius Spencer MD  Consulting Provider: Aiden Warner MD  Primary Care Physician: Manish oCoper Jr, MD  Principal Problem:CAD (coronary artery disease)    Patient information was obtained from patient and past medical records.     Inpatient consult to Interventional Cardiology  Consult performed by: Aiden Warner MD  Consult ordered by: Robyn Perales NP        Subjective:     Chief Complaint:  Chest pain      HPI:  Juventino You is a 57 y.o. 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 Patient reports he is normally active and can climb a flight of stairs without getting out of breath, but over the past month he has been getting chest pain on exertion and short of breath with activity. Chest pain resolves with rest. He presented to outside hospital with chest pain and LHC showed multivessel disease. Normal EF on echo. Patient currently on heparin gtt and holding coumadin. [place] [facility] [place] however, patient prefers attempt with PCI. IC was then consulted for evaulation.            No past medical history on file.    No past surgical history on file.    Review of patient's allergies indicates:  No Known Allergies    PTA Medications   Medication Sig    amLODIPine (NORVASC) 2.5 MG tablet Take 2.5 mg by mouth once daily.    aspirin 325 MG tablet Take 325 mg by mouth once daily.    omeprazole (PRILOSEC) 20 MG capsule Take 20 mg by mouth once daily.    pravastatin (PRAVACHOL) 20 MG tablet Take 20 mg by mouth every evening.    warfarin (COUMADIN) 10 MG tablet Take 10 mg by mouth once daily. Every Mon and Weds    warfarin (COUMADIN) 7.5 MG tablet Take 7.5 mg  by mouth once daily. Every tues, Thurs, Fri, Sat. And Sun     Family History    None       Tobacco Use    Smoking status: Not on file    Smokeless tobacco: Not on file   Substance and Sexual Activity    Alcohol use: Not on file    Drug use: Not on file    Sexual activity: Not on file     Review of Systems   Cardiovascular:  Positive for chest pain.   All other systems reviewed and are negative.  Objective:     Vital Signs (Most Recent):  Temp: 98 °F (36.7 °C) (12/15/22 1140)  Pulse: (!) 58 (12/15/22 1140)  Resp: 18 (12/15/22 1140)  BP: 125/70 (12/15/22 1140)  SpO2: 97 % (12/15/22 1140)   Vital Signs (24h Range):  Temp:  [96.4 °F (35.8 °C)-98.2 °F (36.8 °C)] 98 °F (36.7 °C)  Pulse:  [45-63] 58  Resp:  [16-18] 18  SpO2:  [95 %-98 %] 97 %  BP: (103-132)/(58-70) 125/70     Weight: 100.7 kg (222 lb)  Body mass index is 29.29 kg/m².    SpO2: 97 %       No intake or output data in the 24 hours ending 12/15/22 1340    Lines/Drains/Airways       Peripheral Intravenous Line  Duration                  Peripheral IV - Single Lumen 12/14/22 1908 20 G Left Wrist <1 day                    Physical Exam  Vitals and nursing note reviewed.   Constitutional:       Appearance: Normal appearance.   HENT:      Head: Normocephalic and atraumatic.      Right Ear: External ear normal.      Left Ear: External ear normal.      Nose: Nose normal.      Mouth/Throat:      Mouth: Mucous membranes are moist.   Eyes:      Pupils: Pupils are equal, round, and reactive to light.   Cardiovascular:      Rate and Rhythm: Normal rate and regular rhythm.      Pulses: Normal pulses.   Pulmonary:      Effort: Pulmonary effort is normal.   Abdominal:      General: Abdomen is flat.   Musculoskeletal:         General: Normal range of motion.      Cervical back: Normal range of motion.      Right lower leg: No edema.      Left lower leg: No edema.   Skin:     General: Skin is warm and dry.      Capillary Refill: Capillary refill takes less than 2 seconds.    Neurological:      General: No focal deficit present.      Mental Status: He is alert and oriented to person, place, and time.       Significant Labs: All pertinent lab results from the last 24 hours have been reviewed.    Significant Imaging:  reviewed    Assessment and Plan:     * CAD (coronary artery disease)  Patient transferred for CABG, however, he prefers PCI.    --PCI to LAD and diagnostic images to RCA  - Anti-platelet Therapy: ASA / clopidogrel   - Access: Right radial  - Catheters: Iggy  - Creatinine/CrCl: 1.0  - Allergies: No shellfish / Iodine allergy  - Pre-Hydration: NS  - Pre-Op Med: Bendaryl 50mg pO   - All patient's questions were answered.  -The risks, benefits and alternatives of the procedure were explained to the patient.   -The risks of coronary angiography include but are not limited to: bleeding, infection, heart rhythm abnormalities, allergic reactions, kidney injury and potential need for dialysis, stroke and death.   - Should stenting be indicated, the patient has agreed to dual anti-platelet therapy for 1-consecutive year with a drug-eluting stent and a minimum of 1-month with the use of a bare metal stent  - Additionally, pt is aware that non-compliance is likely to result in stent clotting with heart attack, heart failure, and/or death  - Informed consent was obtained and the  patient is agreeable to proceed with the procedure.      Essential hypertension  -per primary     S/P MVR (mitral valve replacement)  -heparin gtt         VTE Risk Mitigation (From admission, onward)         Ordered     heparin 25,000 units in dextrose 5% 250 mL (100 units/mL) infusion (heparin infusion - NO NOMOGRAM)  Continuous        See Hyperspace for full Linked Orders Report.    12/15/22 1036     Place sequential compression device  Until discontinued         12/14/22 1659     IP VTE LOW RISK PATIENT  Once         12/14/22 1659                Thank you for your consult. I will follow-up with patient.  Please contact us if you have any additional questions.    Aiden Warner MD  Interventional Cardiology   [facility]

## 2022-12-15 NOTE — ASSESSMENT & PLAN NOTE
Patient transferred for CABG, however, he prefers PCI.    --PCI to LAD and diagnostic images to RCA  - Anti-platelet Therapy: ASA / clopidogrel   - Access: Right radial  - Catheters: Iggy  - Creatinine/CrCl: 1.0  - Allergies: No shellfish / Iodine allergy  - Pre-Hydration: NS  - Pre-Op Med: Bendaryl 50mg pO   - All patient's questions were answered.  -The risks, benefits and alternatives of the procedure were explained to the patient.   -The risks of coronary angiography include but are not limited to: bleeding, infection, heart rhythm abnormalities, allergic reactions, kidney injury and potential need for dialysis, stroke and death.   - Should stenting be indicated, the patient has agreed to dual anti-platelet therapy for 1-consecutive year with a drug-eluting stent and a minimum of 1-month with the use of a bare metal stent  - Additionally, pt is aware that non-compliance is likely to result in stent clotting with heart attack, heart failure, and/or death  - Informed consent was obtained and the  patient is agreeable to proceed with the procedure.

## 2022-12-15 NOTE — PLAN OF CARE
Problem: Adult Inpatient Plan of Care  Goal: Plan of Care Review  Outcome: Ongoing, Progressing  Goal: Patient-Specific Goal (Individualized)  Outcome: Ongoing, Progressing  Goal: Absence of Hospital-Acquired Illness or Injury  Outcome: Ongoing, Progressing  Goal: Optimal Comfort and Wellbeing  Outcome: Ongoing, Progressing  Goal: Readiness for Transition of Care  Outcome: Ongoing, Progressing     Problem: Fall Injury Risk  Goal: Absence of Fall and Fall-Related Injury  Outcome: Ongoing, Progressing     Problem: Adjustment to Illness (Heart Failure)  Goal: Optimal Coping  Outcome: Ongoing, Progressing     Problem: Cardiac Output Decreased (Heart Failure)  Goal: Optimal Cardiac Output  Outcome: Ongoing, Progressing     Problem: Dysrhythmia (Heart Failure)  Goal: Stable Heart Rate and Rhythm  Outcome: Ongoing, Progressing     Problem: Fluid Imbalance (Heart Failure)  Goal: Fluid Balance  Outcome: Ongoing, Progressing     Problem: Functional Ability Impaired (Heart Failure)  Goal: Optimal Functional Ability  Outcome: Ongoing, Progressing     Problem: Oral Intake Inadequate (Heart Failure)  Goal: Optimal Nutrition Intake  Outcome: Ongoing, Progressing     Problem: Respiratory Compromise (Heart Failure)  Goal: Effective Oxygenation and Ventilation  Outcome: Ongoing, Progressing     Problem: Sleep Disordered Breathing (Heart Failure)  Goal: Effective Breathing Pattern During Sleep  Outcome: Ongoing, Progressing     Problem: Pain Acute  Goal: Acceptable Pain Control and Functional Ability  Outcome: Ongoing, Progressing

## 2022-12-15 NOTE — PLAN OF CARE
PCP Manish Cooper   Pharmacy [place] [facility]     Lives with significant other -independent - denied any anticipated discharge needs - family will provide transportation home        12/15/22 1120   Discharge Assessment   Assessment Type Discharge Planning Assessment   Confirmed/corrected address, phone number and insurance Yes   Confirmed Demographics   (corrected in EPIC)   Source of Information patient   People in Home significant other   Do you expect to return to your current living situation? Yes   Do you have help at home or someone to help you manage your care at home? Yes   Prior to hospitilization cognitive status: Alert/Oriented   Current cognitive status: Alert/Oriented   Equipment Currently Used at Home none   Readmission within 30 days? No   Do you currently have service(s) that help you manage your care at home? No   Do you take prescription medications? Yes   Do you have prescription coverage? Yes   Do you have any problems affording any of your prescribed medications? No   Is the patient taking medications as prescribed? yes   Who is going to help you get home at discharge? family   How do you get to doctors appointments? car, drives self   Are you on dialysis? No   Do you take coumadin? No   Discharge Plan A Home with family   DME Needed Upon Discharge  none   Discharge Plan discussed with: Patient   Discharge Barriers Identified None

## 2022-12-15 NOTE — ASSESSMENT & PLAN NOTE
- S/P Mechanical MVR in 2001  - Holding home Coumadin   - Heparin drip with no nomogram   - PTT goal is 60-80  - q6 hour PTT while on heparin   - Contact CTS provider for increase in heparin drip if outside of PTT goal

## 2022-12-15 NOTE — ASSESSMENT & PLAN NOTE
- ASA  - BB  - Statin  - Lasix  with scheduled potassium ordered   - Encourage ambulation  - Cardiac diet with 1500 cc fluid restriction   - Bowel regimen in place   - Famotidine for ulcer prevention   - Monitor electrolytes to keep Mag above 2 and Potassium above 4  - OOBTO   - Encourage IS use  - Heparin - no nomogram  - PTT goal is 60-80

## 2022-12-16 PROBLEM — Z95.2 S/P AVR (AORTIC VALVE REPLACEMENT): Status: ACTIVE | Noted: 2022-12-16

## 2022-12-16 LAB
ANION GAP SERPL CALC-SCNC: 7 MMOL/L (ref 8–16)
APTT BLDCRRT: 41.6 SEC (ref 21–32)
APTT BLDCRRT: 47.1 SEC (ref 21–32)
APTT BLDCRRT: 47.5 SEC (ref 21–32)
BASOPHILS # BLD AUTO: 0.08 K/UL (ref 0–0.2)
BASOPHILS NFR BLD: 0.9 % (ref 0–1.9)
BUN SERPL-MCNC: 15 MG/DL (ref 6–20)
CALCIUM SERPL-MCNC: 9.2 MG/DL (ref 8.7–10.5)
CHLORIDE SERPL-SCNC: 102 MMOL/L (ref 95–110)
CO2 SERPL-SCNC: 24 MMOL/L (ref 23–29)
CREAT SERPL-MCNC: 0.9 MG/DL (ref 0.5–1.4)
DIFFERENTIAL METHOD: ABNORMAL
EOSINOPHIL # BLD AUTO: 0.2 K/UL (ref 0–0.5)
EOSINOPHIL NFR BLD: 2.2 % (ref 0–8)
ERYTHROCYTE [DISTWIDTH] IN BLOOD BY AUTOMATED COUNT: 12.5 % (ref 11.5–14.5)
EST. GFR  (NO RACE VARIABLE): >60 ML/MIN/1.73 M^2
GLUCOSE SERPL-MCNC: 97 MG/DL (ref 70–110)
HCT VFR BLD AUTO: 42.3 % (ref 40–54)
HGB BLD-MCNC: 14.4 G/DL (ref 14–18)
IMM GRANULOCYTES # BLD AUTO: 0.05 K/UL (ref 0–0.04)
IMM GRANULOCYTES NFR BLD AUTO: 0.6 % (ref 0–0.5)
INR PPP: 1.1 (ref 0.8–1.2)
LYMPHOCYTES # BLD AUTO: 1.6 K/UL (ref 1–4.8)
LYMPHOCYTES NFR BLD: 17.1 % (ref 18–48)
MCH RBC QN AUTO: 33.1 PG (ref 27–31)
MCHC RBC AUTO-ENTMCNC: 34 G/DL (ref 32–36)
MCV RBC AUTO: 97 FL (ref 82–98)
MONOCYTES # BLD AUTO: 0.5 K/UL (ref 0.3–1)
MONOCYTES NFR BLD: 5.9 % (ref 4–15)
NEUTROPHILS # BLD AUTO: 6.7 K/UL (ref 1.8–7.7)
NEUTROPHILS NFR BLD: 73.3 % (ref 38–73)
NRBC BLD-RTO: 0 /100 WBC
PLATELET # BLD AUTO: 193 K/UL (ref 150–450)
PMV BLD AUTO: 9.8 FL (ref 9.2–12.9)
POC ACTIVATED CLOTTING TIME K: 233 SEC (ref 74–137)
POTASSIUM SERPL-SCNC: 4 MMOL/L (ref 3.5–5.1)
PROTHROMBIN TIME: 11.4 SEC (ref 9–12.5)
RBC # BLD AUTO: 4.35 M/UL (ref 4.6–6.2)
SAMPLE: ABNORMAL
SODIUM SERPL-SCNC: 133 MMOL/L (ref 136–145)
WBC # BLD AUTO: 9.09 K/UL (ref 3.9–12.7)

## 2022-12-16 PROCEDURE — G0378 HOSPITAL OBSERVATION PER HR: HCPCS

## 2022-12-16 PROCEDURE — 85025 COMPLETE CBC W/AUTO DIFF WBC: CPT | Performed by: STUDENT IN AN ORGANIZED HEALTH CARE EDUCATION/TRAINING PROGRAM

## 2022-12-16 PROCEDURE — 93010 ELECTROCARDIOGRAM REPORT: CPT | Mod: ,,, | Performed by: INTERNAL MEDICINE

## 2022-12-16 PROCEDURE — 99223 1ST HOSP IP/OBS HIGH 75: CPT | Mod: ,,, | Performed by: STUDENT IN AN ORGANIZED HEALTH CARE EDUCATION/TRAINING PROGRAM

## 2022-12-16 PROCEDURE — 20600001 HC STEP DOWN PRIVATE ROOM

## 2022-12-16 PROCEDURE — 25000003 PHARM REV CODE 250: Performed by: STUDENT IN AN ORGANIZED HEALTH CARE EDUCATION/TRAINING PROGRAM

## 2022-12-16 PROCEDURE — 25000003 PHARM REV CODE 250: Performed by: NURSE PRACTITIONER

## 2022-12-16 PROCEDURE — 63600175 PHARM REV CODE 636 W HCPCS

## 2022-12-16 PROCEDURE — 63600175 PHARM REV CODE 636 W HCPCS: Performed by: THORACIC SURGERY (CARDIOTHORACIC VASCULAR SURGERY)

## 2022-12-16 PROCEDURE — 85610 PROTHROMBIN TIME: CPT

## 2022-12-16 PROCEDURE — 80048 BASIC METABOLIC PNL TOTAL CA: CPT | Performed by: STUDENT IN AN ORGANIZED HEALTH CARE EDUCATION/TRAINING PROGRAM

## 2022-12-16 PROCEDURE — 93010 EKG 12-LEAD: ICD-10-PCS | Mod: ,,, | Performed by: INTERNAL MEDICINE

## 2022-12-16 PROCEDURE — 96366 THER/PROPH/DIAG IV INF ADDON: CPT

## 2022-12-16 PROCEDURE — 93005 ELECTROCARDIOGRAM TRACING: CPT

## 2022-12-16 PROCEDURE — 96376 TX/PRO/DX INJ SAME DRUG ADON: CPT

## 2022-12-16 PROCEDURE — 85730 THROMBOPLASTIN TIME PARTIAL: CPT | Performed by: THORACIC SURGERY (CARDIOTHORACIC VASCULAR SURGERY)

## 2022-12-16 PROCEDURE — 36415 COLL VENOUS BLD VENIPUNCTURE: CPT

## 2022-12-16 PROCEDURE — 63600175 PHARM REV CODE 636 W HCPCS: Performed by: NURSE PRACTITIONER

## 2022-12-16 PROCEDURE — 99223 PR INITIAL HOSPITAL CARE,LEVL III: ICD-10-PCS | Mod: ,,, | Performed by: STUDENT IN AN ORGANIZED HEALTH CARE EDUCATION/TRAINING PROGRAM

## 2022-12-16 PROCEDURE — 36415 COLL VENOUS BLD VENIPUNCTURE: CPT | Performed by: THORACIC SURGERY (CARDIOTHORACIC VASCULAR SURGERY)

## 2022-12-16 RX ORDER — HEPARIN SODIUM 10000 [USP'U]/100ML
1700 INJECTION, SOLUTION INTRAVENOUS CONTINUOUS
Status: DISCONTINUED | OUTPATIENT
Start: 2022-12-16 | End: 2022-12-16

## 2022-12-16 RX ORDER — POLYETHYLENE GLYCOL 3350 17 G/17G
17 POWDER, FOR SOLUTION ORAL DAILY
Status: DISCONTINUED | OUTPATIENT
Start: 2022-12-17 | End: 2022-12-18 | Stop reason: HOSPADM

## 2022-12-16 RX ORDER — HEPARIN SODIUM 10000 [USP'U]/100ML
2300 INJECTION, SOLUTION INTRAVENOUS CONTINUOUS
Status: DISCONTINUED | OUTPATIENT
Start: 2022-12-16 | End: 2022-12-18 | Stop reason: HOSPADM

## 2022-12-16 RX ORDER — HEPARIN SODIUM 10000 [USP'U]/100ML
1900 INJECTION, SOLUTION INTRAVENOUS CONTINUOUS
Status: DISCONTINUED | OUTPATIENT
Start: 2022-12-16 | End: 2022-12-16

## 2022-12-16 RX ORDER — HEPARIN SODIUM 10000 [USP'U]/100ML
2100 INJECTION, SOLUTION INTRAVENOUS CONTINUOUS
Status: DISCONTINUED | OUTPATIENT
Start: 2022-12-16 | End: 2022-12-16

## 2022-12-16 RX ORDER — WARFARIN SODIUM 5 MG/1
10 TABLET ORAL DAILY
Status: DISCONTINUED | OUTPATIENT
Start: 2022-12-16 | End: 2022-12-18 | Stop reason: HOSPADM

## 2022-12-16 RX ADMIN — CLOPIDOGREL BISULFATE 75 MG: 75 TABLET ORAL at 08:12

## 2022-12-16 RX ADMIN — WARFARIN SODIUM 10 MG: 5 TABLET ORAL at 05:12

## 2022-12-16 RX ADMIN — FAMOTIDINE 20 MG: 20 TABLET ORAL at 08:12

## 2022-12-16 RX ADMIN — ASPIRIN 81 MG: 81 TABLET, COATED ORAL at 08:12

## 2022-12-16 RX ADMIN — PRAVASTATIN SODIUM 20 MG: 20 TABLET ORAL at 08:12

## 2022-12-16 RX ADMIN — HEPARIN SODIUM 1700 UNITS/HR: 10000 INJECTION, SOLUTION INTRAVENOUS at 04:12

## 2022-12-16 RX ADMIN — HEPARIN SODIUM 1900 UNITS/HR: 10000 INJECTION, SOLUTION INTRAVENOUS at 06:12

## 2022-12-16 RX ADMIN — POTASSIUM CHLORIDE 20 MEQ: 1500 TABLET, EXTENDED RELEASE ORAL at 08:12

## 2022-12-16 RX ADMIN — METOPROLOL TARTRATE 12.5 MG: 25 TABLET, FILM COATED ORAL at 08:12

## 2022-12-16 RX ADMIN — FUROSEMIDE 20 MG: 10 INJECTION, SOLUTION INTRAMUSCULAR; INTRAVENOUS at 08:12

## 2022-12-16 RX ADMIN — METOPROLOL TARTRATE 12.5 MG: 25 TABLET, FILM COATED ORAL at 09:12

## 2022-12-16 NOTE — CONSULTS
[facility] - Cardiology [facility] Medicine  Consult Note    Patient Name: Juventino You  MRN: [id number]  Admission Date: 12/13/2022  Hospital Length of Stay: 3 days  Attending Physician: Vinicius Spencer MD   Primary Care Provider: Manish Cooper Jr, MD           Patient information was obtained from patient, past medical records and ER records.     Consults  Subjective:     Principal Problem: CAD (coronary artery disease)    Chief Complaint: No chief complaint on file.       HPI: Mr. You 58 yo M with PMH of AVR in 2001, HTN, and tobacco abuse transferred from [facility] for CABG after patient had C with multivessel disease. Patient states he was having chest pain and SOB during exertion and relieved during rest for the past month. Patient had a repeat LHC with 99% stenosis in proximal LAD and underwent PCI with stent placement yesterday. Patient declined CABG intervention because he did not want to go through the recovery. Recent ECHO shows normal EF. Patient smokes 1.5 PPD for 30 years and drinks occasionally. Currently on heparin drip with plans to transition to Coumadin until INR goal of 2.5-3.5. Patient had recent episode of chest discomfort, palpitations, and diaphoretic last night. Patient no longer has any surgical need and will transfer to  until AC in therapeutic range of INR.       No past medical history on file.    Past Surgical History:   Procedure Laterality Date    ANGIOGRAM, CORONARY, WITH LEFT HEART CATHETERIZATION Left 12/15/2022    Procedure: Angiogram, Coronary, with Left Heart Cath;  Surgeon: Joseph Puentes MD;  Location: [facility] CATH LAB;  Service: Cardiology;  Laterality: Left;    STENT, DRUG ELUTING, SINGLE VESSEL, CORONARY  12/15/2022    Procedure: Stent, Drug Eluting, Single Vessel, Coronary;  Surgeon: Joseph Puentes MD;  Location: [facility] CATH LAB;  Service: Cardiology;;       Review of patient's allergies indicates:  No Known Allergies    No current  facility-administered medications on file prior to encounter.     Current Outpatient Medications on File Prior to Encounter   Medication Sig    amLODIPine (NORVASC) 2.5 MG tablet Take 2.5 mg by mouth once daily.    aspirin 325 MG tablet Take 325 mg by mouth once daily.    omeprazole (PRILOSEC) 20 MG capsule Take 20 mg by mouth once daily.    pravastatin (PRAVACHOL) 20 MG tablet Take 20 mg by mouth every evening.    warfarin (COUMADIN) 10 MG tablet Take 10 mg by mouth once daily. Every Mon and Weds    warfarin (COUMADIN) 7.5 MG tablet Take 7.5 mg by mouth once daily. Every tues, Thurs, Fri, Sat. And Sun     Family History    None       Tobacco Use    Smoking status: Not on file    Smokeless tobacco: Not on file   Substance and Sexual Activity    Alcohol use: Not on file    Drug use: Not on file    Sexual activity: Not on file     Review of Systems   Constitutional:  Positive for diaphoresis. Negative for chills and fever.   HENT:  Negative for rhinorrhea.    Eyes:  Negative for discharge.   Respiratory:  Negative for cough and shortness of breath.    Cardiovascular:  Positive for chest pain and palpitations.   Gastrointestinal:  Positive for constipation. Negative for abdominal distention, abdominal pain, blood in stool, diarrhea, nausea and vomiting.   Genitourinary:  Negative for flank pain and hematuria.   Musculoskeletal:  Negative for back pain.   Skin:  Negative for rash.   Neurological:  Negative for headaches.   Hematological:  Does not bruise/bleed easily.   Psychiatric/Behavioral:  Negative for confusion.    Objective:     Vital Signs (Most Recent):  Temp: 98.5 °F (36.9 °C) (12/16/22 1110)  Pulse: (!) 59 (12/16/22 1126)  Resp: 17 (12/16/22 1110)  BP: 115/70 (12/16/22 1110)  SpO2: 96 % (12/16/22 1110)   Vital Signs (24h Range):  Temp:  [97.2 °F (36.2 °C)-99 °F (37.2 °C)] 98.5 °F (36.9 °C)  Pulse:  [52-91] 59  Resp:  [16-18] 17  SpO2:  [92 %-97 %] 96 %  BP: (108-169)/(66-81) 115/70     Weight:  100.7 kg (222 lb)  Body mass index is 29.29 kg/m².    Physical Exam  Constitutional:       General: He is not in acute distress.     Appearance: Normal appearance. He is not ill-appearing.   HENT:      Head: Normocephalic and atraumatic.      Mouth/Throat:      Pharynx: No posterior oropharyngeal erythema.   Eyes:      General: No scleral icterus.        Right eye: No discharge.         Left eye: No discharge.      Extraocular Movements: Extraocular movements intact.      Conjunctiva/sclera: Conjunctivae normal.   Cardiovascular:      Rate and Rhythm: Normal rate and regular rhythm.      Heart sounds: Murmur heard.      Comments: AVR   Pulmonary:      Effort: Pulmonary effort is normal.      Breath sounds: Normal breath sounds. No wheezing or rales.   Chest:      Chest wall: No tenderness.   Abdominal:      General: Abdomen is flat. There is no distension.      Palpations: Abdomen is soft.      Tenderness: There is no abdominal tenderness.   Musculoskeletal:         General: Normal range of motion.      Cervical back: Normal range of motion.      Right lower leg: No edema.      Left lower leg: No edema.   Skin:     General: Skin is warm and dry.      Capillary Refill: Capillary refill takes less than 2 seconds.      Findings: No erythema.   Neurological:      General: No focal deficit present.      Mental Status: He is alert and oriented to person, place, and time.   Psychiatric:         Mood and Affect: Mood normal.         Behavior: Behavior normal.         Thought Content: Thought content normal.         Judgment: Judgment normal.       Significant Labs: All pertinent labs within the past 24 hours have been reviewed.  CBC:   Recent Labs   Lab 12/14/22  1717 12/15/22  0555 12/16/22  0654   WBC 8.02 7.18 9.09   HGB 13.4* 13.8* 14.4   HCT 40.1 41.5 42.3    166 193     CMP:   Recent Labs   Lab 12/14/22  1717 12/15/22  0555 12/16/22  0654    137 133*   K 4.0 4.5 4.0    104 102   CO2 23 26 24   GLU  83 91 97   BUN 12 11 15   CREATININE 0.8 1.0 0.9   CALCIUM 9.0 9.1 9.2   ANIONGAP 9 7* 7*       Significant Imaging: I have reviewed all pertinent imaging results/findings within the past 24 hours.  I have reviewed and interpreted all pertinent imaging results/findings within the past 24 hours.    Assessment/Plan:     * CAD (coronary artery disease)  56 yo M with PMH of AVR, HTN, and tobacco abuse transferred from OSH for CABG evaluation after [facility] showed multivessel disease. Patient received LHC and PCI for 99% stenosis of proximal LAD with stent placement. Patient declined CABG treatment because he does not want to go through recovery process. Currently on Heparin gtt with plans to transition to Coumadin with INR goal of 2.5-3.5. Patient did have episode of chest discomfort, palpitations, and diaphoresis last night. EKG unremarkable. Recent ECHO shows EF 68% with normal LV and RV systolic function. Medicine consulted due to patient no longer needing surgical intervention and continue to manage heparin/coumadin.     -Continue management per primary team. Will be transferred to  tomorrow morning.   -Continue triple therapy at post-op of ASA, Plavix, and Statin.   -Continue Metoprolol   -Continue Lasix with KCl   -Continue Heparin gtt with plans for Coumadin transition with INR goal of 2.5-3.5  -Cardiac diet  -PRN EKG for additional episodes of chest pain.       S/P AVR (aortic valve replacement)  S/P AVR in 2001. Takes Coumadin at home.     -Continue Heparin drip with PTT goal of 60-80 per primary team  -Will transition to coumadin with INR goal of 2.5-3.5  -Daily PT/INR      Essential hypertension  Home Amlodipine held due to patient being normotensive during hospitalization.    -Continue Metoprolol per primary team.  -Maintain MAPs >60        VTE Risk Mitigation (From admission, onward)         Ordered     warfarin (COUMADIN) tablet 10 mg  Daily         12/16/22 1104     heparin 25,000 units in dextrose 5% 250 mL  (100 units/mL) infusion (heparin infusion - NO NOMOGRAM)  Continuous        See Hyperspace for full Linked Orders Report.    12/16/22 1407     heparin (porcine) injection  As needed (PRN)         12/15/22 1633     heparin infusion 1,000 units/500 ml in 0.9% NaCl (on sterile field)  As needed (PRN)         12/15/22 1608     Place sequential compression device  Until discontinued         12/14/22 1659     IP VTE LOW RISK PATIENT  Once         12/14/22 1659                    Thank you for your consult. I will follow-up with patient. Please contact us if you have any additional questions.    Marshall Newell,   Department of Hospital Medicine   [facility] - Cardiology Stepdown

## 2022-12-16 NOTE — ASSESSMENT & PLAN NOTE
- S/P Mechanical MVR in 2001  - Restarting home coumadin   - Heparin drip with no nomogram   - PTT goal is 60-80  - q6 hour PTT while on heparin   - Contact CTS provider for increase in heparin drip if outside of PTT goal  - INR goal is 2.5-3.5

## 2022-12-16 NOTE — ASSESSMENT & PLAN NOTE
- ASA for one month post procedure PCI  - BB  - Plavix post stent placement  - Okay with triple therapy s/p LAD PCI  - Statin  - Lasix  with scheduled potassium ordered   - Encourage ambulation  - Cardiac diet   - Famotidine for ulcer prevention   - Monitor electrolytes to keep Mag above 2 and Potassium above 4  - OOBTO   - Encourage IS use  - Heparin - no nomogram  - PTT goal is 60-80  - Coumadin 10 mg   - INR goal 2.5-3.5

## 2022-12-16 NOTE — SUBJECTIVE & OBJECTIVE
No past medical history on file.    Past Surgical History:   Procedure Laterality Date    ANGIOGRAM, CORONARY, WITH LEFT HEART CATHETERIZATION Left 12/15/2022    Procedure: Angiogram, Coronary, with Left Heart Cath;  Surgeon: Joseph Puentes MD;  Location: [facility] CATH LAB;  Service: Cardiology;  Laterality: Left;    STENT, DRUG ELUTING, SINGLE VESSEL, CORONARY  12/15/2022    Procedure: Stent, Drug Eluting, Single Vessel, Coronary;  Surgeon: Joseph Puentes MD;  Location: [facility] CATH LAB;  Service: Cardiology;;       Review of patient's allergies indicates:  No Known Allergies    No current facility-administered medications on file prior to encounter.     Current Outpatient Medications on File Prior to Encounter   Medication Sig    amLODIPine (NORVASC) 2.5 MG tablet Take 2.5 mg by mouth once daily.    aspirin 325 MG tablet Take 325 mg by mouth once daily.    omeprazole (PRILOSEC) 20 MG capsule Take 20 mg by mouth once daily.    pravastatin (PRAVACHOL) 20 MG tablet Take 20 mg by mouth every evening.    warfarin (COUMADIN) 10 MG tablet Take 10 mg by mouth once daily. Every Mon and Weds    warfarin (COUMADIN) 7.5 MG tablet Take 7.5 mg by mouth once daily. Every tues, Thurs, Fri, Sat. And Sun     Family History    None       Tobacco Use    Smoking status: Not on file    Smokeless tobacco: Not on file   Substance and Sexual Activity    Alcohol use: Not on file    Drug use: Not on file    Sexual activity: Not on file     Review of Systems   Constitutional:  Positive for diaphoresis. Negative for chills and fever.   HENT:  Negative for rhinorrhea.    Eyes:  Negative for discharge.   Respiratory:  Negative for cough and shortness of breath.    Cardiovascular:  Positive for chest pain and palpitations.   Gastrointestinal:  Positive for constipation. Negative for abdominal distention, abdominal pain, blood in stool, diarrhea, nausea and vomiting.   Genitourinary:  Negative for flank pain and hematuria.   Musculoskeletal:   Negative for back pain.   Skin:  Negative for rash.   Neurological:  Negative for headaches.   Hematological:  Does not bruise/bleed easily.   Psychiatric/Behavioral:  Negative for confusion.    Objective:     Vital Signs (Most Recent):  Temp: 98.5 °F (36.9 °C) (12/16/22 1110)  Pulse: (!) 59 (12/16/22 1126)  Resp: 17 (12/16/22 1110)  BP: 115/70 (12/16/22 1110)  SpO2: 96 % (12/16/22 1110)   Vital Signs (24h Range):  Temp:  [97.2 °F (36.2 °C)-99 °F (37.2 °C)] 98.5 °F (36.9 °C)  Pulse:  [52-91] 59  Resp:  [16-18] 17  SpO2:  [92 %-97 %] 96 %  BP: (108-169)/(66-81) 115/70     Weight: 100.7 kg (222 lb)  Body mass index is 29.29 kg/m².    Physical Exam  Constitutional:       General: He is not in acute distress.     Appearance: Normal appearance. He is not ill-appearing.   HENT:      Head: Normocephalic and atraumatic.      Mouth/Throat:      Pharynx: No posterior oropharyngeal erythema.   Eyes:      General: No scleral icterus.        Right eye: No discharge.         Left eye: No discharge.      Extraocular Movements: Extraocular movements intact.      Conjunctiva/sclera: Conjunctivae normal.   Cardiovascular:      Rate and Rhythm: Normal rate and regular rhythm.      Heart sounds: Murmur heard.      Comments: AVR   Pulmonary:      Effort: Pulmonary effort is normal.      Breath sounds: Normal breath sounds. No wheezing or rales.   Chest:      Chest wall: No tenderness.   Abdominal:      General: Abdomen is flat. There is no distension.      Palpations: Abdomen is soft.      Tenderness: There is no abdominal tenderness.   Musculoskeletal:         General: Normal range of motion.      Cervical back: Normal range of motion.      Right lower leg: No edema.      Left lower leg: No edema.   Skin:     General: Skin is warm and dry.      Capillary Refill: Capillary refill takes less than 2 seconds.      Findings: No erythema.   Neurological:      General: No focal deficit present.      Mental Status: He is alert and oriented to  person, place, and time.   Psychiatric:         Mood and Affect: Mood normal.         Behavior: Behavior normal.         Thought Content: Thought content normal.         Judgment: Judgment normal.       Significant Labs: All pertinent labs within the past 24 hours have been reviewed.  CBC:   Recent Labs   Lab 12/14/22  1717 12/15/22  0555 12/16/22  0654   WBC 8.02 7.18 9.09   HGB 13.4* 13.8* 14.4   HCT 40.1 41.5 42.3    166 193     CMP:   Recent Labs   Lab 12/14/22  1717 12/15/22  0555 12/16/22  0654    137 133*   K 4.0 4.5 4.0    104 102   CO2 23 26 24   GLU 83 91 97   BUN 12 11 15   CREATININE 0.8 1.0 0.9   CALCIUM 9.0 9.1 9.2   ANIONGAP 9 7* 7*       Significant Imaging: I have reviewed all pertinent imaging results/findings within the past 24 hours.  I have reviewed and interpreted all pertinent imaging results/findings within the past 24 hours.

## 2022-12-16 NOTE — NURSING
Vasc band released at 1900, Pt complains of numbness around his right thumb area.  Sensation present, no discoloration noted, capillary refill less than 2, radial pulse present +2, MD resident on call notified and aware.  Endorsed to night shift ALBERTO lopez.

## 2022-12-16 NOTE — PLAN OF CARE
SW was alerted that patient's surgery had been completed and he was medically ready to be transferred back to [facility]. SW initiated transfer with [facility] (ext [id number] opt [id number]).  SW was later informed Pt reported chest pains so he would be kept overnight for observation. FUENTES cancelled transfer request and sent e-mail to weekend CM team with information and instructions for transfer tomorrow.    Bridgette Noble, [facility]  Case Management Department  [email or web address]

## 2022-12-16 NOTE — PROGRESS NOTES
[facility] - Cardiology Stepdown  Cardiothoracic Surgery  Progress Note    Patient Name: Juventino You  MRN: [id number]  Admission Date: 12/13/2022  Hospital Length of Stay: 3 days  Code Status: Full Code   Attending Physician: Vinicius Spencer MD   Referring Provider: Bret Gunter Jr.,*  Principal Problem:CAD (coronary artery disease)  Subjective:     Post-Op Info:  Procedure(s) (LRB):  Angiogram, Coronary, with Left Heart Cath (Left)  Stent, Drug Eluting, Single Vessel, Coronary   1 Day Post-Op     Interval History: PCI to LAD yesterday with interventional cardiology.  Patient was transferred for a CABG evaluation however he decline operative intervention stating he does not want to go through the recovery period again.  He has a previous mechanical mitral valve.  INR goal is 2.5-3.5, rerstarting home coumadin, remains on heparin drip until goal is met.  Discussed dual antiplatelets with interventional cardiology.  They would like ASA for one month and then can be stopped due to LAD stent.  Will continue triple therapy for now.  Consult placed for hospital medicine per IC request as patient does not have any surgical needs     Review of Systems   Constitutional: Negative for malaise/fatigue.   Cardiovascular:  Negative for chest pain, claudication, dyspnea on exertion, irregular heartbeat, leg swelling and palpitations.   Respiratory:  Negative for cough and shortness of breath.    Hematologic/Lymphatic: Negative for bleeding problem.   Gastrointestinal:  Negative for abdominal pain.   Genitourinary:  Negative for dysuria.   Neurological:  Negative for headaches and weakness.   Medications:  Continuous Infusions:   heparin (porcine) in 5 % dex 1,900 Units/hr (12/16/22 1639)     Scheduled Meds:   aspirin  81 mg Oral Daily    clopidogreL  75 mg Oral Daily    famotidine  20 mg Oral BID    furosemide (LASIX) injection  20 mg Intravenous BID    metoprolol tartrate  12.5 mg Oral BID    potassium chloride  20  mEq Oral BID    pravastatin  20 mg Oral QHS     PRN Meds:diphenhydrAMINE, heparin (porcine), heparin (porcine), iohexoL, LIDOcaine HCL 10 mg/ml (1%), nitroglycerin 200 mcg/mL, ondansetron, oxyCODONE, PHENYLephrine, sodium chloride 0.9%, sodium chloride 0.9%, verapamil-nitroGLYCERIN 2.5-0.2 mg in NS 10 mL     Objective:     Vital Signs (Most Recent):  Temp: 97.2 °F (36.2 °C) (12/16/22 0753)  Pulse: (!) 58 (12/16/22 0753)  Resp: 17 (12/16/22 0753)  BP: 120/73 (12/16/22 0753)  SpO2: 97 % (12/16/22 0753)   Vital Signs (24h Range):  Temp:  [97.2 °F (36.2 °C)-99 °F (37.2 °C)] 97.2 °F (36.2 °C)  Pulse:  [52-91] 58  Resp:  [16-18] 17  SpO2:  [92 %-97 %] 97 %  BP: (108-169)/(66-81) 120/73     Weight: 100.7 kg (222 lb)  Body mass index is 29.29 kg/m².    SpO2: 97 %       Intake/Output - Last 3 Shifts         12/14 0700  12/15 0659 12/15 0700  12/16 0659 12/16 0700  12/17 0659    P.O.  250 180    I.V. (mL/kg)  290 (2.9)     Total Intake(mL/kg)  540 (5.4) 180 (1.8)    Urine (mL/kg/hr)  900 (0.4) 450 (1.1)    Total Output  900 450    Net  -360 -270           Urine Occurrence 2 x 2 x             Lines/Drains/Airways       Peripheral Intravenous Line  Duration                  Peripheral IV - Single Lumen 12/14/22 1908 20 G Left Wrist 1 day                    Physical Exam  HENT:      Head: Normocephalic and atraumatic.   Eyes:      Extraocular Movements: Extraocular movements intact.   Cardiovascular:      Rate and Rhythm: Normal rate and regular rhythm.   Pulmonary:      Effort: Pulmonary effort is normal.   Abdominal:      General: Abdomen is flat.      Palpations: Abdomen is soft.   Musculoskeletal:         General: Normal range of motion.      Cervical back: Normal range of motion.   Skin:     General: Skin is warm and dry.      Capillary Refill: Capillary refill takes less than 2 seconds.   Neurological:      General: No focal deficit present.       Significant Labs:  BMP:   Recent Labs   Lab 12/16/22  0654   GLU 97   NA  133*   K 4.0      CO2 24   BUN 15   CREATININE 0.9   CALCIUM 9.2     CBC:   Recent Labs   Lab 12/16/22  0654   WBC 9.09   RBC 4.35*   HGB 14.4   HCT 42.3      MCV 97   MCH 33.1*   MCHC 34.0     CMP:   Recent Labs   Lab 12/16/22  0654   GLU 97   CALCIUM 9.2   *   K 4.0   CO2 24      BUN 15   CREATININE 0.9     Coagulation:   Recent Labs   Lab 12/14/22  1717 12/14/22  2348 12/16/22  0654   INR 1.1  --   --    APTT 29.1   < > 41.6*    < > = values in this interval not displayed.       Significant Diagnostics:  I have reviewed and interpreted all pertinent imaging results/findings within the past 24 hours.    Assessment/Plan:     * CAD (coronary artery disease)  - ASA for one month post procedure PCI  - BB  - Plavix post stent placement  - Okay with triple therapy s/p LAD PCI  - Statin  - Lasix  with scheduled potassium ordered   - Encourage ambulation  - Cardiac diet   - Famotidine for ulcer prevention   - Monitor electrolytes to keep Mag above 2 and Potassium above 4  - OOBTO   - Encourage IS use  - Heparin - no nomogram  - PTT goal is 60-80  - Coumadin 10 mg   - INR goal 2.5-3.5      Essential hypertension  - Holding home amlodipine   - Keeps MAPs 60-80    S/P MVR (mitral valve replacement)  - S/P Mechanical MVR in 2001  - Restarting home coumadin   - Heparin drip with no nomogram   - PTT goal is 60-80  - q6 hour PTT while on heparin   - Contact CTS provider for increase in heparin drip if outside of PTT goal  - INR goal is 2.5-3.5        Robyn Perales NP  Cardiothoracic Surgery  [facility] - Cardiology Stepdown

## 2022-12-16 NOTE — ASSESSMENT & PLAN NOTE
Home Amlodipine held due to patient being normotensive during hospitalization.    -Continue Metoprolol per primary team.  -Maintain MAPs >60

## 2022-12-16 NOTE — HPI
Mr. You 56 yo M with PMH of AVR in 2001, HTN, and tobacco abuse transferred from [facility] for CABG after patient had C with multivessel disease. Patient states he was having chest pain and SOB during exertion and relieved during rest for the past month. Patient had a repeat LHC with 99% stenosis in proximal LAD and underwent PCI with stent placement yesterday. Patient declined CABG intervention because he did not want to go through the recovery. Recent ECHO shows normal EF. Patient smokes 1.5 PPD for 30 years and drinks occasionally. Currently on heparin drip with plans to transition to Coumadin until INR goal of 2.5-3.5. Patient had recent episode of chest discomfort, palpitations, and diaphoretic last night. Patient no longer has any surgical need and will transfer to  until AC in therapeutic range of INR.

## 2022-12-16 NOTE — ASSESSMENT & PLAN NOTE
56 yo M with PMH of AVR, HTN, and tobacco abuse transferred from OSH for CABG evaluation after LHC showed multivessel disease. Patient received LHC and PCI for 99% stenosis of proximal LAD with stent placement. Patient declined CABG treatment because he does not want to go through recovery process. Currently on Heparin gtt with plans to transition to Coumadin with INR goal of 2.5-3.5. Patient did have episode of chest discomfort, palpitations, and diaphoresis last night. EKG unremarkable. Recent ECHO shows EF 68% with normal LV and RV systolic function. Medicine consulted due to patient no longer needing surgical intervention and continue to manage heparin/coumadin.     -Continue management per primary team. Will be transferred to  tomorrow morning.   -Continue triple therapy at post-op of ASA, Plavix, and Statin.   -Continue Metoprolol   -Continue Lasix with KCl   -Continue Heparin gtt with plans for Coumadin transition with INR goal of 2.5-3.5  -Cardiac diet  -PRN EKG for additional episodes of chest pain.

## 2022-12-16 NOTE — SUBJECTIVE & OBJECTIVE
Interval History: PCI to LAD yesterday with interventional cardiology.  Patient was transferred for a CABG evaluation however he decline operative intervention stating he does not want to go through the recovery period again.  He has a previous mechanical mitral valve.  INR goal is 2.5-3.5, rerstarting home coumadin, remains on heparin drip until goal is met.  Discussed dual antiplatelets with interventional cardiology.  They would like ASA for one month and then can be stopped due to LAD stent.  Will continue triple therapy for now.  Consult placed for hospital medicine per IC request as patient does not have any surgical needs     Review of Systems   Constitutional: Negative for malaise/fatigue.   Cardiovascular:  Negative for chest pain, claudication, dyspnea on exertion, irregular heartbeat, leg swelling and palpitations.   Respiratory:  Negative for cough and shortness of breath.    Hematologic/Lymphatic: Negative for bleeding problem.   Gastrointestinal:  Negative for abdominal pain.   Genitourinary:  Negative for dysuria.   Neurological:  Negative for headaches and weakness.   Medications:  Continuous Infusions:   heparin (porcine) in 5 % dex 1,900 Units/hr (12/16/22 0635)     Scheduled Meds:   aspirin  81 mg Oral Daily    clopidogreL  75 mg Oral Daily    famotidine  20 mg Oral BID    furosemide (LASIX) injection  20 mg Intravenous BID    metoprolol tartrate  12.5 mg Oral BID    potassium chloride  20 mEq Oral BID    pravastatin  20 mg Oral QHS     PRN Meds:diphenhydrAMINE, heparin (porcine), heparin (porcine), iohexoL, LIDOcaine HCL 10 mg/ml (1%), nitroglycerin 200 mcg/mL, ondansetron, oxyCODONE, PHENYLephrine, sodium chloride 0.9%, sodium chloride 0.9%, verapamil-nitroGLYCERIN 2.5-0.2 mg in NS 10 mL     Objective:     Vital Signs (Most Recent):  Temp: 97.2 °F (36.2 °C) (12/16/22 0753)  Pulse: (!) 58 (12/16/22 0753)  Resp: 17 (12/16/22 0753)  BP: 120/73 (12/16/22 0753)  SpO2: 97 % (12/16/22 0753)   Vital  Signs (24h Range):  Temp:  [97.2 °F (36.2 °C)-99 °F (37.2 °C)] 97.2 °F (36.2 °C)  Pulse:  [52-91] 58  Resp:  [16-18] 17  SpO2:  [92 %-97 %] 97 %  BP: (108-169)/(66-81) 120/73     Weight: 100.7 kg (222 lb)  Body mass index is 29.29 kg/m².    SpO2: 97 %       Intake/Output - Last 3 Shifts         12/14 0700  12/15 0659 12/15 0700 12/16 0659 12/16 0700 12/17 0659    P.O.  250 180    I.V. (mL/kg)  290 (2.9)     Total Intake(mL/kg)  540 (5.4) 180 (1.8)    Urine (mL/kg/hr)  900 (0.4) 450 (1.1)    Total Output  900 450    Net  -360 -270           Urine Occurrence 2 x 2 x             Lines/Drains/Airways       Peripheral Intravenous Line  Duration                  Peripheral IV - Single Lumen 12/14/22 1908 20 G Left Wrist 1 day                    Physical Exam  HENT:      Head: Normocephalic and atraumatic.   Eyes:      Extraocular Movements: Extraocular movements intact.   Cardiovascular:      Rate and Rhythm: Normal rate and regular rhythm.   Pulmonary:      Effort: Pulmonary effort is normal.   Abdominal:      General: Abdomen is flat.      Palpations: Abdomen is soft.   Musculoskeletal:         General: Normal range of motion.      Cervical back: Normal range of motion.   Skin:     General: Skin is warm and dry.      Capillary Refill: Capillary refill takes less than 2 seconds.   Neurological:      General: No focal deficit present.       Significant Labs:  BMP:   Recent Labs   Lab 12/16/22  0654   GLU 97   *   K 4.0      CO2 24   BUN 15   CREATININE 0.9   CALCIUM 9.2     CBC:   Recent Labs   Lab 12/16/22  0654   WBC 9.09   RBC 4.35*   HGB 14.4   HCT 42.3      MCV 97   MCH 33.1*   MCHC 34.0     CMP:   Recent Labs   Lab 12/16/22  0654   GLU 97   CALCIUM 9.2   *   K 4.0   CO2 24      BUN 15   CREATININE 0.9     Coagulation:   Recent Labs   Lab 12/14/22  1717 12/14/22  2348 12/16/22  0654   INR 1.1  --   --    APTT 29.1   < > 41.6*    < > = values in this interval not displayed.        Significant Diagnostics:  I have reviewed and interpreted all pertinent imaging results/findings within the past 24 hours.

## 2022-12-16 NOTE — ASSESSMENT & PLAN NOTE
S/P AVR in 2001. Takes Coumadin at home.     -Continue Heparin drip with PTT goal of 60-80 per primary team  -Will transition to coumadin with INR goal of 2.5-3.5  -Daily PT/INR

## 2022-12-17 VITALS
HEIGHT: 73 IN | SYSTOLIC BLOOD PRESSURE: 124 MMHG | HEART RATE: 62 BPM | RESPIRATION RATE: 16 BRPM | OXYGEN SATURATION: 96 % | DIASTOLIC BLOOD PRESSURE: 79 MMHG | WEIGHT: 222 LBS | TEMPERATURE: 98 F | BODY MASS INDEX: 29.42 KG/M2

## 2022-12-17 LAB
APTT BLDCRRT: 58.1 SEC (ref 21–32)
APTT BLDCRRT: 68 SEC (ref 21–32)
APTT BLDCRRT: 76.9 SEC (ref 21–32)
BASOPHILS # BLD AUTO: 0.12 K/UL (ref 0–0.2)
BASOPHILS NFR BLD: 1.2 % (ref 0–1.9)
BNP SERPL-MCNC: 22 PG/ML (ref 0–99)
DIFFERENTIAL METHOD: ABNORMAL
EOSINOPHIL # BLD AUTO: 0.3 K/UL (ref 0–0.5)
EOSINOPHIL NFR BLD: 2.9 % (ref 0–8)
ERYTHROCYTE [DISTWIDTH] IN BLOOD BY AUTOMATED COUNT: 12.6 % (ref 11.5–14.5)
HCT VFR BLD AUTO: 46.7 % (ref 40–54)
HGB BLD-MCNC: 15.5 G/DL (ref 14–18)
IMM GRANULOCYTES # BLD AUTO: 0.07 K/UL (ref 0–0.04)
IMM GRANULOCYTES NFR BLD AUTO: 0.7 % (ref 0–0.5)
INR PPP: 1.1 (ref 0.8–1.2)
LYMPHOCYTES # BLD AUTO: 2.5 K/UL (ref 1–4.8)
LYMPHOCYTES NFR BLD: 26.2 % (ref 18–48)
MCH RBC QN AUTO: 32.4 PG (ref 27–31)
MCHC RBC AUTO-ENTMCNC: 33.2 G/DL (ref 32–36)
MCV RBC AUTO: 98 FL (ref 82–98)
MONOCYTES # BLD AUTO: 0.7 K/UL (ref 0.3–1)
MONOCYTES NFR BLD: 7.5 % (ref 4–15)
NEUTROPHILS # BLD AUTO: 6 K/UL (ref 1.8–7.7)
NEUTROPHILS NFR BLD: 61.5 % (ref 38–73)
NRBC BLD-RTO: 0 /100 WBC
PLATELET # BLD AUTO: 210 K/UL (ref 150–450)
PMV BLD AUTO: 9.7 FL (ref 9.2–12.9)
PROTHROMBIN TIME: 11.6 SEC (ref 9–12.5)
RBC # BLD AUTO: 4.78 M/UL (ref 4.6–6.2)
WBC # BLD AUTO: 9.71 K/UL (ref 3.9–12.7)

## 2022-12-17 PROCEDURE — 85730 THROMBOPLASTIN TIME PARTIAL: CPT

## 2022-12-17 PROCEDURE — 36415 COLL VENOUS BLD VENIPUNCTURE: CPT | Performed by: HOSPITALIST

## 2022-12-17 PROCEDURE — 63600175 PHARM REV CODE 636 W HCPCS: Performed by: NURSE PRACTITIONER

## 2022-12-17 PROCEDURE — 20600001 HC STEP DOWN PRIVATE ROOM

## 2022-12-17 PROCEDURE — 85730 THROMBOPLASTIN TIME PARTIAL: CPT | Mod: 91 | Performed by: HOSPITALIST

## 2022-12-17 PROCEDURE — 94760 N-INVAS EAR/PLS OXIMETRY 1: CPT

## 2022-12-17 PROCEDURE — 94761 N-INVAS EAR/PLS OXIMETRY MLT: CPT

## 2022-12-17 PROCEDURE — 96376 TX/PRO/DX INJ SAME DRUG ADON: CPT

## 2022-12-17 PROCEDURE — 99233 SBSQ HOSP IP/OBS HIGH 50: CPT | Mod: ,,, | Performed by: HOSPITALIST

## 2022-12-17 PROCEDURE — 99233 PR SUBSEQUENT HOSPITAL CARE,LEVL III: ICD-10-PCS | Mod: ,,, | Performed by: HOSPITALIST

## 2022-12-17 PROCEDURE — 25000003 PHARM REV CODE 250

## 2022-12-17 PROCEDURE — 85610 PROTHROMBIN TIME: CPT

## 2022-12-17 PROCEDURE — 85025 COMPLETE CBC W/AUTO DIFF WBC: CPT | Performed by: STUDENT IN AN ORGANIZED HEALTH CARE EDUCATION/TRAINING PROGRAM

## 2022-12-17 PROCEDURE — 25000003 PHARM REV CODE 250: Performed by: STUDENT IN AN ORGANIZED HEALTH CARE EDUCATION/TRAINING PROGRAM

## 2022-12-17 PROCEDURE — 63600175 PHARM REV CODE 636 W HCPCS

## 2022-12-17 PROCEDURE — 83880 ASSAY OF NATRIURETIC PEPTIDE: CPT | Performed by: STUDENT IN AN ORGANIZED HEALTH CARE EDUCATION/TRAINING PROGRAM

## 2022-12-17 PROCEDURE — 25000003 PHARM REV CODE 250: Performed by: NURSE PRACTITIONER

## 2022-12-17 PROCEDURE — 96366 THER/PROPH/DIAG IV INF ADDON: CPT

## 2022-12-17 PROCEDURE — G0378 HOSPITAL OBSERVATION PER HR: HCPCS

## 2022-12-17 PROCEDURE — 36415 COLL VENOUS BLD VENIPUNCTURE: CPT

## 2022-12-17 RX ADMIN — POTASSIUM CHLORIDE 20 MEQ: 1500 TABLET, EXTENDED RELEASE ORAL at 08:12

## 2022-12-17 RX ADMIN — PRAVASTATIN SODIUM 20 MG: 20 TABLET ORAL at 08:12

## 2022-12-17 RX ADMIN — POTASSIUM CHLORIDE 20 MEQ: 1500 TABLET, EXTENDED RELEASE ORAL at 09:12

## 2022-12-17 RX ADMIN — CLOPIDOGREL BISULFATE 75 MG: 75 TABLET ORAL at 09:12

## 2022-12-17 RX ADMIN — HEPARIN SODIUM 2300 UNITS/HR: 10000 INJECTION, SOLUTION INTRAVENOUS at 03:12

## 2022-12-17 RX ADMIN — HEPARIN SODIUM 2300 UNITS/HR: 10000 INJECTION, SOLUTION INTRAVENOUS at 04:12

## 2022-12-17 RX ADMIN — METOPROLOL TARTRATE 12.5 MG: 25 TABLET, FILM COATED ORAL at 08:12

## 2022-12-17 RX ADMIN — FAMOTIDINE 20 MG: 20 TABLET ORAL at 08:12

## 2022-12-17 RX ADMIN — WARFARIN SODIUM 10 MG: 5 TABLET ORAL at 04:12

## 2022-12-17 RX ADMIN — METOPROLOL TARTRATE 12.5 MG: 25 TABLET, FILM COATED ORAL at 09:12

## 2022-12-17 RX ADMIN — POLYETHYLENE GLYCOL 3350 17 G: 17 POWDER, FOR SOLUTION ORAL at 09:12

## 2022-12-17 RX ADMIN — FAMOTIDINE 20 MG: 20 TABLET ORAL at 09:12

## 2022-12-17 RX ADMIN — ASPIRIN 81 MG: 81 TABLET, COATED ORAL at 09:12

## 2022-12-17 RX ADMIN — FUROSEMIDE 20 MG: 10 INJECTION, SOLUTION INTRAMUSCULAR; INTRAVENOUS at 09:12

## 2022-12-17 NOTE — PROGRESS NOTES
[facility] - Cardiology [facility] Medicine  Progress Note    Patient Name: Juventino You  MRN: [id number]  Patient Class: IP- Inpatient   Admission Date: 12/13/2022  Length of Stay: 4 days  Attending Physician: Chris Myers MD  Primary Care Provider: Manish Cooper Jr, MD        Subjective:     Principal Problem:CAD (coronary artery disease)        HPI:  Mr. You 56 yo M with PMH of AVR in 2001, HTN, and tobacco abuse transferred from [facility] for CABG after patient had LHC with multivessel disease. Patient states he was having chest pain and SOB during exertion and relieved during rest for the past month. Patient had a repeat LHC with 99% stenosis in proximal LAD and underwent PCI with stent placement yesterday. Patient declined CABG intervention because he did not want to go through the recovery. Recent ECHO shows normal EF. Patient smokes 1.5 PPD for 30 years and drinks occasionally. Currently on heparin drip with plans to transition to Coumadin until INR goal of 2.5-3.5. Patient had recent episode of chest discomfort, palpitations, and diaphoretic last night. Patient no longer has any surgical need and will transfer to  until AC in therapeutic range of INR.       Overview/Hospital Course:  12/17 Transfer to hospital medicine, h/o bicuspid AV s/p mechanical aortic valve replacement 2/2 endocarditis in 2001 on warfarin. Transfer  from  [facility] for NSTEMI. underwent  LHC at before transfer wich showed multivessel disease.  had C[facility]urgery evaluation  at [facility] for possible CABG but patient   declined.  Patient had complex PCI with RUBA to LAD x 2.  INR goal is 2.5-3.5. continue ASA, Plavix and coumadin per interventional cardiology.  ASA  can be stopped after 1 month due to LAD stent.  continue heparin drip  until INR therapeutic.  INR at 1.1 . needs transfer back to [place] . reported chest pain?  overnight (patient denies chestpain ). discussed with interventional cardiology. patient  Ok to return back to [facility]            Review of Systems:   Pain scale:   Constitutional:  fever,  chills, headache, vision loss, hearing loss, weight loss, Generalized weakness, falls, loss of smell, loss of taste, poor appetite,  sore throat  Respiratory: cough, shortness of breath.   Cardiovascular: chest pain, dizziness, palpitations, orthopnea, swelling of feet, syncope  Gastrointestinal: nausea, vomiting, abdominal pain, diarrhea, black stool,  blood in stool, change in bowel habits  Genitourinary: hematuria, dysuria, urgency, frequency  Integument/Breast: rash,  pruritis  Hematologic/Lymphatic: easy bruising, lymphadenopathy  Musculoskeletal: arthralgias , myalgias, back pain, neck pain, knee pain  Neurological: confusion, seizures, tremors, slurred speech  Behavioral/Psych:  depression, anxiety, auditory or visual hallucinations     OBJECTIVE:     Physical Exam:  Body mass index is 29.29 kg/m².    Constitutional: Appears well-developed and well-nourished.   Head: Normocephalic and atraumatic.   Neck: Normal range of motion. Neck supple.   Cardiovascular: Normal heart rate.  Regular heart rhythm.  Pulmonary/Chest: Effort normal.   Abdominal: No distension.  No tenderness  Musculoskeletal: Normal range of motion. No edema.   Neurological: Alert and oriented to person, place, and time.   Skin: Skin is warm and dry.   Psychiatric: Normal mood and affect. Behavior is normal.                  Vital Signs  Temp: 97.9 °F (36.6 °C) (12/17/22 1053)  Pulse: (Abnormal) 59 (12/17/22 1244)  Resp: 16 (12/17/22 1244)  BP: 132/71 (12/17/22 1244)  SpO2: 95 % (12/17/22 1244)     24 Hour VS Range    Temp:  [97.2 °F (36.2 °C)-98 °F (36.7 °C)]   Pulse:  [53-72]   Resp:  [16-18]   BP: (112-133)/(71-79)   SpO2:  [94 %-98 %]     Intake/Output Summary (Last 24 hours) at 12/17/2022 1442  Last data filed at 12/17/2022 1356  Gross per 24 hour   Intake 942 ml   Output 700 ml   Net 242 ml         I/O This Shift:  I/O this  shift:  In: 702 [P.O.:702]  Out: -     Wt Readings from Last 3 Encounters:   12/15/22 100.7 kg (222 lb)   12/13/22 104.2 kg (229 lb 11.5 oz)       I have personally reviewed the vitals and recorded Intake/Output     Laboratory/Diagnostic Data:    CBC/Anemia Labs: Coags:    Recent Labs   Lab 12/15/22  0555 12/16/22  0654 12/17/22  0408   WBC 7.18 9.09 9.71   HGB 13.8* 14.4 15.5   HCT 41.5 42.3 46.7    193 210   * 97 98   RDW 12.6 12.5 12.6    Recent Labs   Lab 12/14/22  1717 12/14/22  2348 12/16/22  1155 12/16/22  1303 12/16/22  2028 12/17/22  0408 12/17/22  1321   INR 1.1  --  1.1  --   --  1.1  --    APTT 29.1   < >  --    < > 47.5* 58.1* 76.9*    < > = values in this interval not displayed.        Chemistries: ABG:   Recent Labs   Lab 12/15/22  0555 12/16/22  0654 12/17/22  0408    133* 137   K 4.5 4.0 3.8    102 103   CO2 26 24 23   BUN 11 15 15   CREATININE 1.0 0.9 0.9   CALCIUM 9.1 9.2 9.6    No results for input(s): PH, PCO2, PO2, HCO3, POCSATURATED, BE in the last 168 hours.     POCT Glucose: HbA1c:    No results for input(s): POCTGLUCOSE in the last 168 hours. No results found for: HGBA1C     Cardiac Enzymes: Ejection Fractions:    No results for input(s): CPK, CPKMB, MB, TROPONINI in the last 72 hours. EF   Date Value Ref Range Status   12/15/2022 68 % Final          No results for input(s): COLORU, APPEARANCEUA, PHUR, SPECGRAV, PROTEINUA, GLUCUA, KETONESU, BILIRUBINUA, OCCULTUA, NITRITE, UROBILINOGEN, LEUKOCYTESUR, RBCUA, WBCUA, BACTERIA, SQUAMEPITHEL, HYALINECASTS in the last 48 hours.    Invalid input(s): WRIGHTSUR    No results found for: PROCAL, LACTATE  No results found for: BNP  No results found for: CRP, SEDRATE  No results found for: DDIMER  No results found for: FERRITIN  No results found for: LDH  No results found for: TROPONINI, CPK  No results found for this or any previous visit.  No results found for: [id number]    Microbiology labs for the last week  Microbiology  Results (last 7 days)       ** No results found for the last 168 hours. **            Reviewed and noted in plan where applicable- Please see chart for full lab data.    Lines/Drains:       Peripheral IV - Single Lumen 12/14/22 1908 20 G Left Wrist (Active)   Site Assessment Clean;Dry;Intact;No swelling;No redness;No warmth;No drainage 12/16/22 2011   Extremity Assessment Distal to IV No warmth;No swelling;No redness;No abnormal discoloration 12/16/22 2011   Line Status Flushed;Infusing 12/16/22 2011   Dressing Status Clean;Dry;Intact 12/16/22 2011   Dressing Intervention Integrity maintained 12/16/22 2011   Dressing Change Due 12/18/22 12/16/22 2011   Site Change Due 12/18/22 12/16/22 2011   Reason Not Rotated Anticipated discharge 12/16/22 2011   Number of days: 2       Imaging      Results for orders placed during the hospital encounter of 12/13/22    Echo Saline Bubble? No    Interpretation Summary  · The left ventricle is normal in size with normal systolic function.  · The estimated ejection fraction is 68%.  · Normal left ventricular diastolic function.  · Mild left atrial enlargement.  · Normal right ventricular size with normal right ventricular systolic function.  · Mild right atrial enlargement.  · There is an unknown prosthetic aortic valve present.  · The aortic valve mean gradient is 31 mmHg with a dimensionless index of 0.32.  · Mild tricuspid regurgitation.  · Trivial pericardial effusion. Under the RA.      Cardiac catheterization    The Prox LAD lesion was 99% stenosed with 0% stenosis   post-intervention.    A STENT FRONTIER SANAM 3.73L15VM stent was successfully placed at 14 PREM   for 5 sec.    A STENT FRONTIER SANAM [id number]  at 14 PREM for 3 sec.    The estimated blood loss was none.    The PCI was successful.    The procedure log was documented by Documenter: Yarely Cross RT;   Na Danielle and verified by Joseph Puentes MD.    Date: 12/16/2022  Time: 4:52 AM      Labs, Imaging,  EKG and Diagnostic results from 12/17/2022 were reviewed.    Medications:  Medication list was reviewed and changes noted under Assessment/Plan.  No current facility-administered medications on file prior to encounter.     Current Outpatient Medications on File Prior to Encounter   Medication Sig Dispense Refill    amLODIPine (NORVASC) 2.5 MG tablet Take 2.5 mg by mouth once daily.      aspirin 325 MG tablet Take 325 mg by mouth once daily.      omeprazole (PRILOSEC) 20 MG capsule Take 20 mg by mouth once daily.      pravastatin (PRAVACHOL) 20 MG tablet Take 20 mg by mouth every evening.      warfarin (COUMADIN) 10 MG tablet Take 10 mg by mouth once daily. Every Mon and Weds      warfarin (COUMADIN) 7.5 MG tablet Take 7.5 mg by mouth once daily. Every tues, Thurs, Fri, Sat. And Sun       Scheduled Medications:  aspirin, 81 mg, Oral, Daily  clopidogreL, 75 mg, Oral, Daily  famotidine, 20 mg, Oral, BID  metoprolol tartrate, 12.5 mg, Oral, BID  polyethylene glycol, 17 g, Oral, Daily  potassium chloride, 20 mEq, Oral, BID  pravastatin, 20 mg, Oral, QHS  warfarin, 10 mg, Oral, Daily      PRN: diphenhydrAMINE, heparin (porcine), heparin (porcine), iohexoL, LIDOcaine HCL 10 mg/ml (1%), nitroglycerin 200 mcg/mL, ondansetron, oxyCODONE, PHENYLephrine, sodium chloride 0.9%, sodium chloride 0.9%, verapamil-nitroGLYCERIN 2.5-0.2 mg in NS 10 mL  Infusions:    heparin (porcine) in 5 % dex 2,300 Units/hr (12/17/22 0313)     Estimated Creatinine Clearance: 115.7 mL/min (based on SCr of 0.9 mg/dL).    Assessment/Plan:      * CAD (coronary artery disease)  56 yo M with PMH of AVR, HTN, and tobacco abuse transferred from OSH for CABG evaluation after LHC showed multivessel disease. Patient received LHC and PCI for 99% stenosis of proximal LAD with stent placement. Patient declined CABG treatment because he does not want to go through recovery process. Currently on Heparin gtt with plans to transition to Coumadin with INR goal of  2.5-3.5. Patient did have episode of chest discomfort, palpitations, and diaphoresis last night. EKG unremarkable. Recent ECHO shows EF 68% with normal LV and RV systolic function. Medicine consulted due to patient no longer needing surgical intervention and continue to manage heparin/coumadin.     -Continue management per primary team. Will be transferred to  tomorrow morning.   -Continue triple therapy at post-op of ASA, Plavix, and Statin.   -Continue Metoprolol   -Continue Lasix with KCl   -Continue Heparin gtt with plans for Coumadin transition with INR goal of 2.5-3.5  -Cardiac diet  -PRN EKG for additional episodes of chest pain.   12/17 Transfer to [facility] medicine, h/o bicuspid AV s/p mechanical aortic valve replacement 2/2 endocarditis in 2001 on warfarin. Transfer  from  [facility] for NSTEMI. underwent  LHC at before transfer wich showed multivessel disease.  [place] [facility] [place] but patient   declined.  Patient had complex PCI with RUBA to LAD x 2.  INR goal is 2.5-3.5. continue ASA, Plavix and coumadin per interventional cardiology.  ASA  can be stopped after 1 month due to LAD stent.  continue heparin drip  until INR therapeutic.  INR at 1.1 . needs transfer back to [place] . reported chest pain?  overnight (patient denies chestpain). discussed with interventional cardiology. patient Ok to return back to [facility]        H/O mechanical aortic valve replacement  12/17 Transfer to [facility] medicine, h/o bicuspid AV s/p mechanical valve replacement 2/2 endocarditis in 2001 on warfarin.   INR goal is 2.5-3.5. continue ASA, Plavix and coumadin per interventional cardiology.  ASA  can be stopped after 1 month due to LAD stent.  continue heparin drip  until INR therapeutic.  INR at 1.1 .        S/P AVR in 2001. Takes Coumadin at home.     -Continue Heparin drip with PTT goal of 60-80 per primary team  -Will transition to coumadin with INR goal of  2.5-3.5  -Daily PT/INR      Essential hypertension  Home Amlodipine held due to patient being normotensive during hospitalization.    -Continue Metoprolol per primary team.  -Maintain MAPs >60        VTE Risk Mitigation (From admission, onward)           Ordered     heparin 25,000 units in dextrose 5% 250 mL (100 units/mL) infusion (heparin infusion - NO NOMOGRAM)  Continuous        See Hyperspace for full Linked Orders Report.    12/16/22 2204     warfarin (COUMADIN) tablet 10 mg  Daily         12/16/22 1104     heparin (porcine) injection  As needed (PRN)         12/15/22 1633     heparin infusion 1,000 units/500 ml in 0.9% NaCl (on sterile field)  As needed (PRN)         12/15/22 1608     Place sequential compression device  Until discontinued         12/14/22 1659     IP VTE LOW RISK PATIENT  Once         12/14/22 1659                    Discharge Planning   NASIR: 12/19/2022     Code Status: Full Code   Is the patient medically ready for discharge?: Yes    Reason for patient still in hospital (select all that apply): Treatment  Discharge Plan A: Home with family                  Chris Myers MD  Department of Hospital Medicine   [facility] - Cardiology Stepdown

## 2022-12-17 NOTE — PLAN OF CARE
Patient free from falls and injuries throughout shift.  AAO and VSS.  Patient denies CP and SOB. Patient continues on heparin gtt @ 2300 units/ hour.  No acute events overnight.  Patient resting well at this time.  Plan of care discussed with patient.  Patient verbalizes understanding.  Will continue to monitor.

## 2022-12-17 NOTE — HOSPITAL COURSE
12/17 Transfer to [facility] medicine, h/o bicuspid AV s/p mechanical aortic valve replacement 2/2 endocarditis in 2001 on warfarin. Transfer  from  [facility] for NSTEMI. underwent  LHC at before transfer wich showed multivessel disease.  [place] [facility] [place] but patient   declined.  Patient had complex PCI with RUBA to LAD x 2.  INR goal is 2.5-3.5. continue ASA, Plavix and coumadin per interventional cardiology.  ASA  can be stopped after 1 month due to LAD stent.  continue heparin drip  until INR therapeutic.  INR at 1.1 . needs transfer back to [place] . reported chest pain?  overnight (patient denies chestpain ). discussed with interventional cardiology. patient Ok to return back to [facility]

## 2022-12-17 NOTE — ASSESSMENT & PLAN NOTE
58 yo M with PMH of AVR, HTN, and tobacco abuse transferred from OSH for CABG evaluation after LHC showed multivessel disease. Patient received LHC and PCI for 99% stenosis of proximal LAD with stent placement. Patient declined CABG treatment because he does not want to go through recovery process. Currently on Heparin gtt with plans to transition to Coumadin with INR goal of 2.5-3.5. Patient did have episode of chest discomfort, palpitations, and diaphoresis last night. EKG unremarkable. Recent ECHO shows EF 68% with normal LV and RV systolic function. Medicine consulted due to patient no longer needing surgical intervention and continue to manage heparin/coumadin.     -Continue management per primary team. Will be transferred to  tomorrow morning.   -Continue triple therapy at post-op of ASA, Plavix, and Statin.   -Continue Metoprolol   -Continue Lasix with KCl   -Continue Heparin gtt with plans for Coumadin transition with INR goal of 2.5-3.5  -Cardiac diet  -PRN EKG for additional episodes of chest pain.   12/17 Transfer to hospital medicine, h/o bicuspid AV s/p mechanical aortic valve replacement 2/2 endocarditis in 2001 on warfarin. Transfer  from  [facility] for NSTEMI. underwent  LHC at before transfer wich showed multivessel disease.  had CTSurgery evaluation  at [facility] for possible CABG but patient   declined.  Patient had complex PCI with RUBA to LAD x 2.  INR goal is 2.5-3.5. continue ASA, Plavix and coumadin per interventional cardiology.  ASA  can be stopped after 1 month due to LAD stent.  continue heparin drip  until INR therapeutic.  INR at 1.1 . needs transfer back to [place] . reported chest pain?  overnight (patient denies chestpain). discussed with interventional cardiology. patient Ok to return back to [facility]

## 2022-12-17 NOTE — CONSULTS
Consult received, chart reviewed, and patient evaluated at bedside. Patient to be transferred to  for further evaluation. Please see Dr. Newell' consult note from same date for full assessment/plan.    Edison Nolasco MD  Department of Internal Medicine  [facility] - [facility]

## 2022-12-17 NOTE — ASSESSMENT & PLAN NOTE
56 yo M with PMH of AVR, HTN, and tobacco abuse transferred from OSH for CABG evaluation after LHC showed multivessel disease. Patient received LHC and PCI for 99% stenosis of proximal LAD with stent placement. Patient declined CABG treatment because he does not want to go through recovery process. Currently on Heparin gtt with plans to transition to Coumadin with INR goal of 2.5-3.5. Patient did have episode of chest discomfort, palpitations, and diaphoresis last night. EKG unremarkable. Recent ECHO shows EF 68% with normal LV and RV systolic function. Medicine consulted due to patient no longer needing surgical intervention and continue to manage heparin/coumadin.     -Continue management per primary team. Will be transferred to  tomorrow morning.   -Continue triple therapy at post-op of ASA, Plavix, and Statin.   -Continue Metoprolol   -Continue Lasix with KCl   -Continue Heparin gtt with plans for Coumadin transition with INR goal of 2.5-3.5  -Cardiac diet  -PRN EKG for additional episodes of chest pain.   12/17 Transfer to hospital medicine, h/o bicuspid AV s/p mechanical aortic valve replacement 2/2 endocarditis in 2001 on warfarin. Transfer  from  [facility] for NSTEMI. underwent  LHC at before transfer wich showed multivessel disease.  had CTSurgery evaluation  at [facility] [place] for possible CABG but patient   declined.  Patient had complex PCI with RUBA to LAD x 2.  INR goal is 2.5-3.5. continue ASA, Plavix and coumadin per interventional cardiology.  ASA  can be stopped after 1 month due to LAD stent.  continue heparin drip  until INR therapeutic.  INR at 1.1 . needs transfer back to [place] . reported chest pain?  overnight (patient denies chestpain )

## 2022-12-17 NOTE — ASSESSMENT & PLAN NOTE
12/17 Transfer to hospital medicine, h/o bicuspid AV s/p mechanical valve replacement 2/2 endocarditis in 2001 on warfarin.   INR goal is 2.5-3.5. continue ASA, Plavix and coumadin per interventional cardiology.  ASA  can be stopped after 1 month due to LAD stent.  continue heparin drip  until INR therapeutic.  INR at 1.1 .        S/P AVR in 2001. Takes Coumadin at home.     -Continue Heparin drip with PTT goal of 60-80 per primary team  -Will transition to coumadin with INR goal of 2.5-3.5  -Daily PT/INR

## 2022-12-17 NOTE — DISCHARGE SUMMARY
Arash Gavin - Cardiology [facility] Medicine  Discharge Summary      Patient Name: Juventino You  MRN: [id number]  ELIE: [id number]  Patient Class: IP- Inpatient  Admission Date: 12/13/2022  Hospital Length of Stay: 4 days  Discharge Date and Time:  12/17/2022 2:42 PM  Attending Physician: Chris Myers MD   Discharging Provider: Chris Myers MD  Primary Care Provider: Manish Cooper Jr, MD  Hospital Medicine Team: [facility] Chris Myers MD  Primary Care Team: [facility]    HPI:   Mr. You 58 yo M with PMH of AVR in 2001, HTN, and tobacco abuse transferred from [facility] for CABG after patient had LHC with multivessel disease. Patient states he was having chest pain and SOB during exertion and relieved during rest for the past month. Patient had a repeat LHC with 99% stenosis in proximal LAD and underwent PCI with stent placement yesterday. Patient declined CABG intervention because he did not want to go through the recovery. Recent ECHO shows normal EF. Patient smokes 1.5 PPD for 30 years and drinks occasionally. Currently on heparin drip with plans to transition to Coumadin until INR goal of 2.5-3.5. Patient had recent episode of chest discomfort, palpitations, and diaphoretic last night. Patient no longer has any surgical need and will transfer to  until AC in therapeutic range of INR.       Procedure(s) (LRB):  Angiogram, Coronary, with Left Heart Cath (Left)  Stent, Drug Eluting, Single Vessel, Coronary      Hospital Course:   12/17 Transfer to [facility] medicine, h/o bicuspid AV s/p mechanical aortic valve replacement 2/2 endocarditis in 2001 on warfarin. Transfer  from  [facility] for NSTEMI. underwent  LHC at before transfer wich showed multivessel disease.  had CTSurgery evaluation  at [facility] for possible CABG but patient   declined.  Patient had complex PCI with RUBA to LAD x 2.  INR goal is 2.5-3.5. continue ASA, Plavix and coumadin per interventional cardiology.   ASA  can be stopped after 1 month due to LAD stent.  continue heparin drip  until INR therapeutic.  INR at 1.1 . needs transfer back to [place] . reported chest pain?  overnight (patient denies chestpain ). discussed with interventional cardiology. patient Ok to return back to [facility]           Goals of Care Treatment Preferences:  Code Status: Full Code      Consults:   Consults (From admission, onward)        Status Ordering Provider     Inpatient consult to [facility] Medicine-General  Once        Provider:  (Not yet assigned)    Completed ANAIS MEDRANO     Inpatient consult to Interventional Cardiology  Once        Provider:  (Not yet assigned)    Completed ANAIS MEDRANO        Assessment/Plan:      * CAD (coronary artery disease)  58 yo M with PMH of AVR, HTN, and tobacco abuse transferred from OSH for CABG evaluation after [facility] showed multivessel disease. Patient received LHC and PCI for 99% stenosis of proximal LAD with stent placement. Patient declined CABG treatment because he does not want to go through recovery process. Currently on Heparin gtt with plans to transition to Coumadin with INR goal of 2.5-3.5. Patient did have episode of chest discomfort, palpitations, and diaphoresis last night. EKG unremarkable. Recent ECHO shows EF 68% with normal LV and RV systolic function. Medicine consulted due to patient no longer needing surgical intervention and continue to manage heparin/coumadin.      -Continue management per primary team. Will be transferred to  tomorrow morning.   -Continue triple therapy at post-op of ASA, Plavix, and Statin.   -Continue Metoprolol   -Continue Lasix with KCl   -Continue Heparin gtt with plans for Coumadin transition with INR goal of 2.5-3.5  -Cardiac diet  -PRN EKG for additional episodes of chest pain.   12/17 Transfer to hospital medicine, h/o bicuspid AV s/p mechanical aortic valve replacement 2/2 endocarditis in 2001 on warfarin. Transfer  from  [place]   hospital for NSTEMI. underwent  LHC at before transfer wich showed multivessel disease.  had CTSurgery evaluation  at [facility] for possible CABG but patient   declined.  Patient had complex PCI with RUBA to LAD x 2.  INR goal is 2.5-3.5. continue ASA, Plavix and coumadin per interventional cardiology.  ASA  can be stopped after 1 month due to LAD stent.  continue heparin drip  until INR therapeutic.  INR at 1.1 . needs transfer back to [place] . reported chest pain?  overnight (patient denies chestpain). discussed with interventional cardiology. patient Ok to return back to [facility]         H/O mechanical aortic valve replacement  12/17 Transfer to [facility] medicine, h/o bicuspid AV s/p mechanical valve replacement 2/2 endocarditis in 2001 on warfarin.   INR goal is 2.5-3.5. continue ASA, Plavix and coumadin per interventional cardiology.  ASA  can be stopped after 1 month due to LAD stent.  continue heparin drip  until INR therapeutic.  INR at 1.1 .         S/P AVR in 2001. Takes Coumadin at home.      -Continue Heparin drip with PTT goal of 60-80 per primary team  -Will transition to coumadin with INR goal of 2.5-3.5  -Daily PT/INR        Essential hypertension  Home Amlodipine held due to patient being normotensive during hospitalization.     -Continue Metoprolol per primary team.  -Maintain MAPs >60          Final Active Diagnoses:    Diagnosis Date Noted POA    PRINCIPAL PROBLEM:  CAD (coronary artery disease) [I25.10] 12/15/2022 Yes    H/O mechanical aortic valve replacement [Z95.2] 12/16/2022 Not Applicable    Essential hypertension [I10] 12/15/2022 Yes      Problems Resolved During this Admission:       Medications:  per [facility]       Discharged Condition: fair    Disposition: Discharged to Other Facility               Follow Up:     Patient Instructions:   No discharge procedures on file.    Laboratory/Diagnostic Data:    CBC/Anemia Labs: Coags:    Recent Labs   Lab 12/15/22  9814  12/16/22  0654 12/17/22  0408   WBC 7.18 9.09 9.71   HGB 13.8* 14.4 15.5   HCT 41.5 42.3 46.7    193 210   * 97 98   RDW 12.6 12.5 12.6    Recent Labs   Lab 12/14/22  1717 12/14/22  2348 12/16/22  1155 12/16/22  1303 12/16/22  2028 12/17/22  0408 12/17/22  1321   INR 1.1  --  1.1  --   --  1.1  --    APTT 29.1   < >  --    < > 47.5* 58.1* 76.9*    < > = values in this interval not displayed.        Chemistries: ABG:   Recent Labs   Lab 12/15/22  0555 12/16/22  0654 12/17/22  0408    133* 137   K 4.5 4.0 3.8    102 103   CO2 26 24 23   BUN 11 15 15   CREATININE 1.0 0.9 0.9   CALCIUM 9.1 9.2 9.6    No results for input(s): PH, PCO2, PO2, HCO3, POCSATURATED, BE in the last 168 hours.     POCT Glucose: HbA1c:    No results for input(s): POCTGLUCOSE in the last 168 hours. No results found for: HGBA1C     Cardiac Enzymes: Ejection Fractions:    No results for input(s): CPK, CPKMB, MB, TROPONINI in the last 72 hours. EF   Date Value Ref Range Status   12/15/2022 68 % Final          No results for input(s): COLORU, APPEARANCEUA, PHUR, SPECGRAV, PROTEINUA, GLUCUA, KETONESU, BILIRUBINUA, OCCULTUA, NITRITE, UROBILINOGEN, LEUKOCYTESUR, RBCUA, WBCUA, BACTERIA, SQUAMEPITHEL, HYALINECASTS in the last 48 hours.    Invalid input(s): WRIGHTSUR    No results found for: PROCAL, LACTATE  No results found for: BNP  No results found for: CRP, SEDRATE  No results found for: DDIMER  No results found for: FERRITIN  No results found for: LDH  No results found for: TROPONINI, CPK  No results found for this or any previous visit.  No results found for: [id number]    Microbiology labs for the last week  Microbiology Results (last 7 days)     ** No results found for the last 168 hours. **            Reviewed and noted in plan where applicable- Please see chart for full lab data.    Lines/Drains:       Peripheral IV - Single Lumen 12/14/22 1908 20 G Left Wrist (Active)   Site Assessment Clean;Dry;No redness;No  swelling;Intact 12/17/22 0800   Extremity Assessment Distal to IV No warmth;No swelling;No redness;No abnormal discoloration 12/17/22 0800   Line Status Saline locked 12/17/22 0800   Dressing Status Clean;Dry;Intact 12/17/22 0800   Dressing Intervention Integrity maintained 12/17/22 0800   Dressing Change Due 12/18/22 12/16/22 2011   Site Change Due 12/18/22 12/16/22 2011   Reason Not Rotated Anticipated discharge 12/16/22 2011   Number of days: 2       Imaging      Results for orders placed during the hospital encounter of 12/13/22    Echo Saline Bubble? No    Interpretation Summary  · The left ventricle is normal in size with normal systolic function.  · The estimated ejection fraction is 68%.  · Normal left ventricular diastolic function.  · Mild left atrial enlargement.  · Normal right ventricular size with normal right ventricular systolic function.  · Mild right atrial enlargement.  · There is an unknown prosthetic aortic valve present.  · The aortic valve mean gradient is 31 mmHg with a dimensionless index of 0.32.  · Mild tricuspid regurgitation.  · Trivial pericardial effusion. Under the RA.      Cardiac catheterization    The Prox LAD lesion was 99% stenosed with 0% stenosis   post-intervention.    A STENT FRONTIER SANAM [id number] stent was successfully placed at 14 PREM   for 5 sec.    A STENT FRONTIER SANAM [id number]  at 14 PREM for 3 sec.    The estimated blood loss was none.    The PCI was successful.    The procedure log was documented by Documenter: Yarely Cross RT;   Na Danielle and verified by Joseph Puentes MD.    Date: 12/16/2022  Time: 4:52 AM      Imaging:        Follow Up Instructions:     No future appointments.    Discharge Instructions:  No discharge procedures on file.    Chris Myers MD  Attending Staff Physician  [facility] Medicine

## 2022-12-17 NOTE — PLAN OF CARE
ON-call CM contacted [facility] to resume patient transfer to [facility].

## 2022-12-18 NOTE — NURSING
Patient discharged at this time per EMS in stable condition. AAO x4. Respirations even and unlabored. No s/s of distress noted. Heparin gtt @ 23 ml/hr (2300 units/hr). Last aPTT 68 (therapeutic x2) Goal is 60-80. Signed off with charge nurse at time of discharge. All night meds administered. Called ALBERTO Avila at [facility] to make her aware of situation above. All patient personal belongings with patient. Cardiac monitor removed. War room notified.

## (undated) DEVICE — CATH TRANSIT

## (undated) DEVICE — HEMOSTAT VASC BAND REG 24CM

## (undated) DEVICE — GUIDE LAUNCHER 6FR EBU 3.5

## (undated) DEVICE — PAD DEFIB CADENCE ADULT R2

## (undated) DEVICE — OMNIPAQUE 350 200ML

## (undated) DEVICE — TRANSDUCER ADULT DISP

## (undated) DEVICE — WIRE BHW 014X300

## (undated) DEVICE — PROTECTION STATION PLUS

## (undated) DEVICE — DRAPE ANGIO BRACH 38X44IN

## (undated) DEVICE — GUIDE WIRE BMW 014 X190

## (undated) DEVICE — GUIDEWIRE SAFE T .035IN 180CM

## (undated) DEVICE — WIRE WHISPER MS 014 X 190

## (undated) DEVICE — GUIDE LAUNCHER 6FR JL 4.0

## (undated) DEVICE — KIT CUSTOM MANIFOLD

## (undated) DEVICE — CUTTER LEAD

## (undated) DEVICE — CATH EMERGE MR 12 X 2.50

## (undated) DEVICE — KIT COPILOT VALVE HEMO TOOL

## (undated) DEVICE — GUIDEWIRE SYNCHRO 2

## (undated) DEVICE — KIT GLIDESHEATH SLEND 6FR 10CM

## (undated) DEVICE — INFLATOR ENCORE 26 BLLN INFL

## (undated) DEVICE — TRAY CATH LAB OMC

## (undated) DEVICE — SPIKE CONTRAST CONTROLLER